# Patient Record
Sex: MALE | Race: WHITE | NOT HISPANIC OR LATINO | Employment: OTHER | URBAN - METROPOLITAN AREA
[De-identification: names, ages, dates, MRNs, and addresses within clinical notes are randomized per-mention and may not be internally consistent; named-entity substitution may affect disease eponyms.]

---

## 2023-05-25 LAB
RBC, URINE: NORMAL /HPF (ref 0–5)
SQUAMOUS EPITHELIAL CELLS, URINE: <1 /HPF
WBC, URINE: <1 /HPF (ref 0–5)

## 2023-05-26 LAB — URINE CULTURE: NORMAL

## 2023-09-20 PROBLEM — R56.9 SEIZURE (MULTI): Status: ACTIVE | Noted: 2023-09-20

## 2023-09-20 PROBLEM — D69.6 THROMBOCYTOPENIA (CMS-HCC): Status: ACTIVE | Noted: 2023-09-20

## 2023-09-20 PROBLEM — N40.0 BENIGN ENLARGEMENT OF PROSTATE: Status: ACTIVE | Noted: 2023-09-20

## 2023-09-20 PROBLEM — L82.1 SEBORRHEIC KERATOSIS: Status: ACTIVE | Noted: 2022-12-05

## 2023-09-20 PROBLEM — E66.3 OVERWEIGHT (BMI 25.0-29.9): Status: ACTIVE | Noted: 2023-09-20

## 2023-09-20 PROBLEM — L85.3 XEROSIS CUTIS: Status: ACTIVE | Noted: 2022-12-05

## 2023-09-20 PROBLEM — F32.A DEPRESSION: Status: ACTIVE | Noted: 2023-09-20

## 2023-09-20 PROBLEM — R39.15 URINARY URGENCY: Status: ACTIVE | Noted: 2023-09-20

## 2023-09-20 PROBLEM — D22.5 MELANOCYTIC NEVI OF TRUNK: Status: ACTIVE | Noted: 2022-12-05

## 2023-09-20 PROBLEM — K75.81 NONALCOHOLIC STEATOHEPATITIS (NASH): Status: ACTIVE | Noted: 2023-09-20

## 2023-09-20 PROBLEM — I63.9 CEREBROVASCULAR ACCIDENT (CVA) (MULTI): Status: ACTIVE | Noted: 2023-09-20

## 2023-09-20 PROBLEM — L57.0 ACTINIC KERATOSIS: Status: ACTIVE | Noted: 2022-12-05

## 2023-09-20 PROBLEM — D36.7 BENIGN NEOPLASM OF UPPER EXTREMITY: Status: ACTIVE | Noted: 2022-12-05

## 2023-09-20 PROBLEM — I87.2 CHRONIC VENOUS INSUFFICIENCY: Status: ACTIVE | Noted: 2022-12-05

## 2023-09-20 PROBLEM — R26.9 ABNORMALITY OF GAIT AND MOBILITY: Status: ACTIVE | Noted: 2023-09-20

## 2023-09-20 PROBLEM — L72.3 SEBACEOUS CYST: Status: ACTIVE | Noted: 2022-12-05

## 2023-09-20 PROBLEM — D18.01 HEMANGIOMA OF SKIN AND SUBCUTANEOUS TISSUE: Status: ACTIVE | Noted: 2022-12-05

## 2023-09-20 PROBLEM — I48.91 ATRIAL FIBRILLATION (MULTI): Status: ACTIVE | Noted: 2023-09-20

## 2023-09-20 PROBLEM — D22.30 MELANOCYTIC NEVI OF UNSPECIFIED PART OF FACE: Status: ACTIVE | Noted: 2022-12-05

## 2023-09-20 PROBLEM — D22.62 MELANOCYTIC NEVI OF LEFT UPPER LIMB, INCLUDING SHOULDER: Status: ACTIVE | Noted: 2022-12-05

## 2023-09-20 PROBLEM — R39.16 STRAINING TO VOID: Status: ACTIVE | Noted: 2023-09-20

## 2023-09-20 PROBLEM — D36.7 BENIGN NEOPLASM OF TRUNK: Status: ACTIVE | Noted: 2022-12-05

## 2023-09-20 PROBLEM — D22.72 MELANOCYTIC NEVI OF LEFT LOWER LIMB, INCLUDING HIP: Status: RESOLVED | Noted: 2022-12-05 | Resolved: 2023-09-20

## 2023-09-20 PROBLEM — R26.2 AMBULATORY DYSFUNCTION: Status: ACTIVE | Noted: 2023-09-20

## 2023-09-20 PROBLEM — I10 HYPERTENSION: Status: ACTIVE | Noted: 2023-09-20

## 2023-09-20 PROBLEM — I69.959 HEMIPLEGIA OF NONDOMINANT SIDE AS LATE EFFECT OF CEREBROVASCULAR DISEASE (MULTI): Status: ACTIVE | Noted: 2023-09-20

## 2023-09-20 PROBLEM — Z95.818 STATUS POST PLACEMENT OF IMPLANTABLE LOOP RECORDER: Status: ACTIVE | Noted: 2023-09-20

## 2023-09-20 PROBLEM — N40.0 BENIGN PROSTATIC HYPERPLASIA: Status: ACTIVE | Noted: 2023-09-20

## 2023-09-20 PROBLEM — D22.39 MELANOCYTIC NEVI OF OTHER PARTS OF FACE: Status: ACTIVE | Noted: 2022-12-05

## 2023-09-20 PROBLEM — E78.5 HYPERLIPIDEMIA: Status: ACTIVE | Noted: 2023-09-20

## 2023-09-20 PROBLEM — B35.3 TINEA PEDIS: Status: ACTIVE | Noted: 2022-12-05

## 2023-09-20 PROBLEM — L81.4 LENTIGINES: Status: ACTIVE | Noted: 2022-12-05

## 2023-09-20 PROBLEM — H91.90 HEARING IMPAIRMENT: Status: ACTIVE | Noted: 2023-09-20

## 2023-09-20 PROBLEM — D22.71 MELANOCYTIC NEVI OF RIGHT LOWER LIMB, INCLUDING HIP: Status: ACTIVE | Noted: 2022-12-05

## 2023-09-20 PROBLEM — D72.819 LEUKOPENIA: Status: ACTIVE | Noted: 2023-09-20

## 2023-09-20 PROBLEM — I73.9 PAD (PERIPHERAL ARTERY DISEASE) (CMS-HCC): Status: ACTIVE | Noted: 2023-09-20

## 2023-09-20 PROBLEM — L21.9 SEBORRHEIC DERMATITIS, UNSPECIFIED: Status: ACTIVE | Noted: 2022-12-05

## 2023-09-20 PROBLEM — Z86.73 HISTORY OF RIGHT MCA STROKE: Status: ACTIVE | Noted: 2023-09-20

## 2023-09-20 PROBLEM — G81.94 LEFT HEMIPARESIS (MULTI): Status: ACTIVE | Noted: 2023-09-20

## 2023-09-20 PROBLEM — L82.1 OTHER SEBORRHEIC KERATOSIS: Status: ACTIVE | Noted: 2022-12-05

## 2023-09-20 PROBLEM — Z79.01 CHRONIC ANTICOAGULATION: Status: ACTIVE | Noted: 2023-09-20

## 2023-09-20 PROBLEM — E55.9 VITAMIN D DEFICIENCY: Status: ACTIVE | Noted: 2023-09-20

## 2023-09-20 PROBLEM — R73.9 HYPERGLYCEMIA: Status: ACTIVE | Noted: 2023-09-20

## 2023-09-20 PROBLEM — L81.4 OTHER MELANIN HYPERPIGMENTATION: Status: ACTIVE | Noted: 2022-12-05

## 2023-09-20 PROBLEM — R34 OLIGURIA: Status: ACTIVE | Noted: 2023-09-20

## 2023-09-20 PROBLEM — D18.00 ANGIOMA: Status: ACTIVE | Noted: 2022-12-05

## 2023-09-20 PROBLEM — R73.03 PREDIABETES: Status: ACTIVE | Noted: 2023-09-20

## 2023-09-20 RX ORDER — WARFARIN 4 MG/1
TABLET ORAL
COMMUNITY

## 2023-09-20 RX ORDER — TRAZODONE HYDROCHLORIDE 50 MG/1
50 TABLET ORAL DAILY
COMMUNITY

## 2023-09-20 RX ORDER — METOPROLOL TARTRATE 50 MG/1
50 TABLET ORAL 2 TIMES DAILY
COMMUNITY
End: 2024-05-23 | Stop reason: SDUPTHER

## 2023-09-20 RX ORDER — TAMSULOSIN HYDROCHLORIDE 0.4 MG/1
1 CAPSULE ORAL DAILY
COMMUNITY
End: 2024-01-18 | Stop reason: SDUPTHER

## 2023-09-20 RX ORDER — DOCUSATE SODIUM 100 MG/1
100 CAPSULE, LIQUID FILLED ORAL 2 TIMES DAILY PRN
COMMUNITY

## 2023-09-20 RX ORDER — TRAMADOL HYDROCHLORIDE 50 MG/1
50 TABLET ORAL EVERY 4 HOURS PRN
COMMUNITY
End: 2023-10-19 | Stop reason: ALTCHOICE

## 2023-09-20 RX ORDER — AMLODIPINE BESYLATE 5 MG/1
5 TABLET ORAL DAILY
COMMUNITY
End: 2023-10-19 | Stop reason: SDUPTHER

## 2023-09-20 RX ORDER — LEVETIRACETAM 750 MG/1
1 TABLET ORAL 2 TIMES DAILY
COMMUNITY
Start: 2019-04-23

## 2023-09-20 RX ORDER — WARFARIN SODIUM 5 MG/1
1 TABLET ORAL DAILY
COMMUNITY

## 2023-09-20 RX ORDER — DULOXETIN HYDROCHLORIDE 30 MG/1
1 CAPSULE, DELAYED RELEASE ORAL DAILY
COMMUNITY
Start: 2022-07-05 | End: 2024-01-18 | Stop reason: SDUPTHER

## 2023-09-20 RX ORDER — BACLOFEN 10 MG/1
1.5 TABLET ORAL 3 TIMES DAILY
COMMUNITY
Start: 2017-04-05

## 2023-09-20 RX ORDER — GABAPENTIN 800 MG/1
800 TABLET ORAL 3 TIMES DAILY
COMMUNITY
End: 2023-10-19 | Stop reason: ALTCHOICE

## 2023-09-20 RX ORDER — ASPIRIN 81 MG/1
81 TABLET ORAL DAILY
COMMUNITY

## 2023-09-20 RX ORDER — ACETAMINOPHEN 500 MG
5000 TABLET ORAL DAILY
COMMUNITY

## 2023-09-20 RX ORDER — FINASTERIDE 5 MG/1
1 TABLET, FILM COATED ORAL DAILY
COMMUNITY
End: 2023-10-19 | Stop reason: ALTCHOICE

## 2023-09-20 RX ORDER — TALC
POWDER (GRAM) TOPICAL
COMMUNITY
End: 2023-10-19 | Stop reason: ALTCHOICE

## 2023-09-20 RX ORDER — KETOCONAZOLE 20 MG/G
1 CREAM TOPICAL
COMMUNITY
Start: 2021-12-01 | End: 2023-10-19 | Stop reason: ALTCHOICE

## 2023-09-20 RX ORDER — AMMONIUM LACTATE 12 G/100G
1 CREAM TOPICAL
COMMUNITY
Start: 2017-05-23 | End: 2023-10-19 | Stop reason: ALTCHOICE

## 2023-09-20 RX ORDER — MELATONIN 5 MG
1 CAPSULE ORAL NIGHTLY PRN
COMMUNITY
End: 2023-10-19 | Stop reason: ALTCHOICE

## 2023-09-20 RX ORDER — ATORVASTATIN CALCIUM 40 MG/1
40 TABLET, FILM COATED ORAL DAILY
COMMUNITY
Start: 2016-11-07 | End: 2024-01-18 | Stop reason: SDUPTHER

## 2023-09-20 RX ORDER — WARFARIN 6 MG/1
1 TABLET ORAL DAILY
COMMUNITY
End: 2024-05-23 | Stop reason: SDUPTHER

## 2023-09-20 RX ORDER — LISINOPRIL 20 MG/1
20 TABLET ORAL DAILY
COMMUNITY
End: 2024-01-18 | Stop reason: DRUGHIGH

## 2023-10-19 ENCOUNTER — OFFICE VISIT (OUTPATIENT)
Dept: CARDIOLOGY | Facility: CLINIC | Age: 78
End: 2023-10-19
Payer: MEDICARE

## 2023-10-19 VITALS
WEIGHT: 200 LBS | BODY MASS INDEX: 28.63 KG/M2 | HEART RATE: 64 BPM | HEIGHT: 70 IN | DIASTOLIC BLOOD PRESSURE: 76 MMHG | SYSTOLIC BLOOD PRESSURE: 118 MMHG | TEMPERATURE: 97.7 F

## 2023-10-19 DIAGNOSIS — I10 PRIMARY HYPERTENSION: ICD-10-CM

## 2023-10-19 DIAGNOSIS — Z79.01 CHRONIC ANTICOAGULATION: ICD-10-CM

## 2023-10-19 DIAGNOSIS — I48.19 PERSISTENT ATRIAL FIBRILLATION (MULTI): Primary | ICD-10-CM

## 2023-10-19 PROCEDURE — 1159F MED LIST DOCD IN RCRD: CPT | Performed by: INTERNAL MEDICINE

## 2023-10-19 PROCEDURE — 3078F DIAST BP <80 MM HG: CPT | Performed by: INTERNAL MEDICINE

## 2023-10-19 PROCEDURE — 3074F SYST BP LT 130 MM HG: CPT | Performed by: INTERNAL MEDICINE

## 2023-10-19 PROCEDURE — 99214 OFFICE O/P EST MOD 30 MIN: CPT | Performed by: INTERNAL MEDICINE

## 2023-10-19 PROCEDURE — 1125F AMNT PAIN NOTED PAIN PRSNT: CPT | Performed by: INTERNAL MEDICINE

## 2023-10-19 RX ORDER — DOXAZOSIN 1 MG/1
1 TABLET ORAL NIGHTLY
COMMUNITY
Start: 2023-06-23

## 2023-10-19 RX ORDER — AMLODIPINE BESYLATE 5 MG/1
5 TABLET ORAL DAILY
Qty: 90 TABLET | Refills: 3 | Status: SHIPPED | OUTPATIENT
Start: 2023-10-19 | End: 2024-10-18

## 2023-10-19 RX ORDER — LISINOPRIL 40 MG/1
40 TABLET ORAL DAILY
COMMUNITY
Start: 2023-09-22 | End: 2024-01-18 | Stop reason: SDUPTHER

## 2023-10-19 ASSESSMENT — PAIN SCALES - GENERAL: PAINLEVEL: 8

## 2023-10-19 NOTE — PROGRESS NOTES
· HTN (hypertension) (401.9) (I10)   · Atrial fibrillation (427.31) (I48.91)   . CVA Right MCA, left hemiparesisPatient is a pleasant 78-year-old male with the above-noted pertinent past medical history who presents today for follow-up.  He is accompanied by his wife who provides most of the history stating that he is doing very well he has no complaints of palpitation, he denies any complaints of orthopnea or PND he mostly ambulates with the help of a wheelchair, he has no complaints of chest pain shortness of breath  Vital signs reviewed and stable,  No carotid bruits upstroke and volumes are normal, heart irregularly irregular S1 variable S2 is normal no systolic or diastolic murmurs appreciated lungs clear to auscultation abdomen is obese positive bowel sounds soft and nontender  Echocardiography showed normal LV systolic function LVEF of 60-65% mild left atrial enlargement, 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

## 2023-10-24 LAB
NON-UH HIE INR: 2.2
NON-UH HIE PROTIME PATIENT: 25.4 SECONDS (ref 9.8–12.8)

## 2023-11-22 LAB
NON-UH HIE INR: 2.1
NON-UH HIE PROTIME PATIENT: 23.8 SECONDS (ref 9.8–12.8)

## 2023-11-24 ENCOUNTER — TELEPHONE (OUTPATIENT)
Dept: PRIMARY CARE | Facility: CLINIC | Age: 78
End: 2023-11-24
Payer: MEDICARE

## 2023-12-11 ENCOUNTER — OFFICE VISIT (OUTPATIENT)
Dept: DERMATOLOGY | Facility: CLINIC | Age: 78
End: 2023-12-11
Payer: MEDICARE

## 2023-12-11 DIAGNOSIS — L57.0 ACTINIC KERATOSIS: ICD-10-CM

## 2023-12-11 DIAGNOSIS — L82.1 SEBORRHEIC KERATOSIS: ICD-10-CM

## 2023-12-11 DIAGNOSIS — D22.9 NEVUS: ICD-10-CM

## 2023-12-11 DIAGNOSIS — Z12.83 SKIN CANCER SCREENING: Primary | ICD-10-CM

## 2023-12-11 DIAGNOSIS — L81.4 LENTIGO: ICD-10-CM

## 2023-12-11 PROCEDURE — 99213 OFFICE O/P EST LOW 20 MIN: CPT | Performed by: NURSE PRACTITIONER

## 2023-12-11 PROCEDURE — 1159F MED LIST DOCD IN RCRD: CPT | Performed by: NURSE PRACTITIONER

## 2023-12-11 PROCEDURE — 1125F AMNT PAIN NOTED PAIN PRSNT: CPT | Performed by: NURSE PRACTITIONER

## 2023-12-11 NOTE — PROGRESS NOTES
Subjective     Jose Angel Riojas is a 78 y.o. male who presents for the following: Skin Check.       Established patient visit. Last seen one year ago.   Hx of actinic keratosis.     Review of Systems:  No other skin or systemic complaints other than what is documented elsewhere in the note.    The following portions of the chart were reviewed this encounter and updated as appropriate:       Skin Cancer History  No skin cancer on file.    Specialty Problems          Dermatology Problems    Actinic keratosis    Angioma    Hemangioma of skin and subcutaneous tissue    Lentigines    Melanocytic nevi of left upper limb, including shoulder    Melanocytic nevi of other parts of face    Melanocytic nevi of right lower limb, including hip    Melanocytic nevi of trunk    Melanocytic nevi of unspecified part of face    Other melanin hyperpigmentation    Other seborrheic keratosis    Sebaceous cyst    Seborrheic dermatitis, unspecified    Seborrheic keratosis    Tinea pedis    Xerosis cutis     Past Medical History:  Jose Angel Riojas  has a past medical history of Benign prostatic hyperplasia with lower urinary tract symptoms (12/21/2013), 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), Disorder of male genital organs, unspecified (06/24/2014), Disorder of the skin and subcutaneous tissue, unspecified, Encounter for immunization, Hemiplegia and hemiparesis following cerebral infarction affecting unspecified side (CMS/HCC) (12/15/2021), History of falling, Neuralgia and neuritis, unspecified (08/20/2019), Other chest pain, Other reduced mobility (02/04/2021), Other specified soft tissue disorders, Other symptoms and signs involving the musculoskeletal system (10/28/2020), Other vitreous opacities, unspecified eye (06/24/2014), Pain in leg, unspecified, Personal history of other (healed) physical injury and trauma, Personal history of other diseases of the musculoskeletal system and connective tissue (02/19/2019), Personal history of other  diseases of the musculoskeletal system and connective tissue (06/24/2014), Personal history of other diseases of the nervous system and sense organs (06/24/2014), Personal history of other specified conditions (12/10/2021), Personal history of other specified conditions, Personal history of other specified conditions, Personal history of other specified conditions (12/06/2018), Personal history of transient ischemic attack (TIA), and cerebral infarction without residual deficits, Personal history of transient ischemic attack (TIA), and cerebral infarction without residual deficits (06/06/2019), Personal history of urinary (tract) infections (12/10/2021), Sciatica, left side, Spastic hemiplegia affecting unspecified side (CMS/HCC) (04/19/2022), and Tinea unguium.    Past Surgical History:  Jose Angel Riojas  has a past surgical history that includes Vasectomy (06/25/2014); Other surgical history (12/14/2021); Other surgical history (12/14/2021); Other surgical history (11/23/2016); Other surgical history (08/18/2016); MR angio head wo IV contrast (6/29/2016); and MR angio neck wo IV contrast (6/29/2016).    Family History:  Patient family history includes Cerebral aneurysm in his father; Coronary artery disease in his brother; Diabetes in his mother; Heart attack in his brother; Valvular heart disease in his father.    Social History:  Jose Angel Riojas  reports that he does not have a smoking history on file. He has never been exposed to tobacco smoke. He has never used smokeless tobacco. He reports that he does not drink alcohol and does not use drugs.    Allergies:  Peanut    Current Medications / CAM's:    Current Outpatient Medications:     amLODIPine (Norvasc) 5 mg tablet, Take 1 tablet (5 mg) by mouth once daily., Disp: 90 tablet, Rfl: 3    aspirin 81 mg EC tablet, Take 1 tablet (81 mg) by mouth once daily., Disp: , Rfl:     atorvastatin (Lipitor) 40 mg tablet, Take 1 tablet (40 mg) by mouth once daily., Disp: ,  Rfl:     baclofen (Lioresal) 10 mg tablet, Take 0.5 tablets (5 mg) by mouth 3 times a day., Disp: , Rfl:     cholecalciferol (Vitamin D3) 50 mcg (2,000 unit) capsule, Take 5,000 Units by mouth early in the morning.., Disp: , Rfl:     docusate sodium (Colace) 100 mg capsule, Take 1 capsule (100 mg) by mouth 2 times a day as needed., Disp: , Rfl:     doxazosin (Cardura) 1 mg tablet, Take 1 tablet (1 mg) by mouth once daily at bedtime., Disp: , Rfl:     DULoxetine (Cymbalta) 30 mg DR capsule, Take 1 capsule (30 mg) by mouth once daily., Disp: , Rfl:     levETIRAcetam (Keppra) 750 mg tablet, Take 1 tablet (750 mg) by mouth 2 times a day., Disp: , Rfl:     lisinopril 20 mg tablet, Take 1 tablet (20 mg) by mouth once daily., Disp: , Rfl:     lisinopril 40 mg tablet, Take 1 tablet (40 mg) by mouth once daily. as directed, Disp: , Rfl:     metoprolol tartrate (Lopressor) 50 mg tablet, Take 1 tablet by mouth 2 times a day., Disp: , Rfl:     tamsulosin (Flomax) 0.4 mg 24 hr capsule, Take 1 capsule (0.4 mg) by mouth once daily., Disp: , Rfl:     traZODone (Desyrel) 50 mg tablet, Take 1 tablet (50 mg) by mouth once daily., Disp: , Rfl:     warfarin (Coumadin) 4 mg tablet, Take by mouth. Take as directed per After Visit Summary., Disp: , Rfl:     warfarin (Coumadin) 5 mg tablet, Take 1 tablet (5 mg) by mouth once daily., Disp: , Rfl:     warfarin (Coumadin) 6 mg tablet, Take 1 tablet (6 mg) by mouth once daily., Disp: , Rfl:      Objective   Well appearing patient in no apparent distress; mood and affect are within normal limits.    A full examination was performed including scalp, head, eyes, ears, nose, lips, neck, chest, axillae, abdomen, back, buttocks, bilateral upper extremities, bilateral lower extremities, hands, feet, fingers, toes, fingernails, and toenails. All findings within normal limits unless otherwise noted below.    Assessment/Plan   1. Skin cancer screening    The patient presented for a routine skin  examination today. There are no specific concerns regarding skin health and no new or changing moles, lesions, or rashes.     Assessment: Based on the comprehensive skin examination, there were no concerning or abnormal findings. The patient's skin appeared to be in good health, without any notable dermatologic conditions or lesions.    Plan: Given the absence of any significant skin findings, no specific interventions or treatments are warranted at this time. The patient was educated on the importance of regular skin self-examinations and advised to promptly report any changes or concerns. Routine follow-up for a skin examination was recommended.    -These lesions have benign, reassuring patterns on dermoscopy.  -There were no concerning features found on exam today.  -Recommend continued self-observation, and to contact the office if any changes in nevi are  noticed.    Discussed/information given on safe sun practices and use of sunscreen, sun protective clothing or sun avoidance. Recommend to use OTC medication of sunscreen SPF 30 or higher on a daily basis prior to sun exposure to reduce the risk of skin cancer.    Contact Office if: Any lesions change in size, shape or color; itch, bum or bleed.         2. Nevus  Multiple benign appearing flesh colored to pigmented macules and papules     Plan: Counseling.  I counseled the patient regarding the following:  Instructions: Monthly self-skin checks to monitor for any changes in moles are recommended. Expectations: Benign Nevi are pigmented nests of cells within the skin.No treatment is necessary. Contact Office if: Any moles change in size, shape or color; itch, bum or bleed.    3. Lentigo  Benign pigmented macules appearing in sun exposed areas     Solar lentigo (a type of lentigo also known as a senile lentigo, age spot, or liver spot) is a benign pigmented macule appearing on fair-skinned individuals that is related to ultraviolet radiation (UVR) exposure,  "typically from the sun.     PLAN:  Limiting sun exposure through avoidance, protective clothing, and use of sunscreens can help prevent the appearance of solar lentigines.    If lesion changes or becomes symptomatic she should return to clinic    4. Seborrheic keratosis    Seborrheic keratoses (SKs) are extremely common benign neoplasms of the skin. There can be few or hundreds of these raised, \"stuck-on\"-appearing papules and plaques with well-defined borders. The cause is unknown, although there is a familial trait for the development of multiple SKs.      SKs tend to increase in incidence and number with increasing age.     Skin Care: Seborrheic Keratoses are benign. No treatment is necessary.    Patient was instructed to call the office if any lesions become irritated or inflamed        5. Actinic keratosis (2)  Right Buccal Cheek, Right Zygomatic Area  Numerous erythematous macules and patches with sandpaper texture    -Discussed nature of diagnosis and treatment options.   -Patient wishes to proceed with Cryotherapy today  -Possible side effects of liquid nitrogen treatment reviewed including formation of blisters, crusting, tenderness, scar, and discoloration which may be permanent.  -Patient advised to return the office for re-evaluation if the treated lesion(s) do not resolve within 4-6 weeks. Patient verbalizes understanding.    Cryotherapy, skin lesion - Right Buccal Cheek, Right Zygomatic Area           "

## 2023-12-20 LAB
NON-UH HIE INR: 2.2
NON-UH HIE PROTIME PATIENT: 24.5 SECONDS (ref 9.8–12.8)

## 2024-01-11 LAB
NON-UH HIE A/G RATIO: 1.2
NON-UH HIE ALB: 4 G/DL (ref 3.4–5)
NON-UH HIE ALK PHOS: 70 UNIT/L (ref 45–117)
NON-UH HIE BILIRUBIN, TOTAL: 1.2 MG/DL (ref 0.3–1.2)
NON-UH HIE BUN/CREAT RATIO: 16
NON-UH HIE BUN: 16 MG/DL (ref 9–23)
NON-UH HIE CALCIUM: 9.5 MG/DL (ref 8.7–10.4)
NON-UH HIE CALCULATED LDL CHOLESTEROL: 68 MG/DL (ref 60–130)
NON-UH HIE CALCULATED OSMOLALITY: 286 MOSM/KG (ref 275–295)
NON-UH HIE CHLORIDE: 106 MMOL/L (ref 98–107)
NON-UH HIE CHOLESTEROL: 120 MG/DL (ref 100–200)
NON-UH HIE CO2, VENOUS: 29 MMOL/L (ref 20–31)
NON-UH HIE CREATININE: 1 MG/DL (ref 0.6–1.1)
NON-UH HIE GFR AA: >60
NON-UH HIE GLOBULIN: 3.2 G/DL
NON-UH HIE GLOMERULAR FILTRATION RATE: >60 ML/MIN/1.73M?
NON-UH HIE GLUCOSE: 101 MG/DL (ref 74–106)
NON-UH HIE GOT: 14 UNIT/L (ref 15–37)
NON-UH HIE GPT: 17 UNIT/L (ref 10–49)
NON-UH HIE HCT: 40.8 % (ref 41–52)
NON-UH HIE HDL CHOLESTEROL: 38 MG/DL (ref 40–60)
NON-UH HIE HGB A1C: 6.1 %
NON-UH HIE HGB: 13.8 G/DL (ref 13.5–17.5)
NON-UH HIE INR: 2
NON-UH HIE INSTR WBC ND: 4.3
NON-UH HIE K: 4.1 MMOL/L (ref 3.5–5.1)
NON-UH HIE MCH: 30.9 PG (ref 27–34)
NON-UH HIE MCHC: 33.9 G/DL (ref 32–37)
NON-UH HIE MCV: 91.4 FL (ref 80–100)
NON-UH HIE MPV: 9.5 FL (ref 7.4–10.4)
NON-UH HIE NA: 143 MMOL/L (ref 135–145)
NON-UH HIE PLATELET: 134 X10 (ref 150–450)
NON-UH HIE PROTIME PATIENT: 23 SECONDS (ref 9.8–12.8)
NON-UH HIE RBC: 4.46 X10 (ref 4.7–6.1)
NON-UH HIE RDW: 14 % (ref 11.5–14.5)
NON-UH HIE TOTAL CHOL/HDL CHOL RATIO: 3.2
NON-UH HIE TOTAL PROTEIN: 7.2 G/DL (ref 5.7–8.2)
NON-UH HIE TRIGLYCERIDES: 72 MG/DL (ref 30–150)
NON-UH HIE TSH: 0.99 UIU/ML (ref 0.55–4.78)
NON-UH HIE VIT D 25: 75 NG/ML
NON-UH HIE WBC: 4.3 X10 (ref 4.5–11)

## 2024-01-12 ENCOUNTER — TELEPHONE (OUTPATIENT)
Dept: PRIMARY CARE | Facility: CLINIC | Age: 79
End: 2024-01-12
Payer: MEDICARE

## 2024-01-12 NOTE — TELEPHONE ENCOUNTER
Pt is aware of results.     ----- Message from Tim Castillo MD sent at 1/12/2024  9:57 AM EST -----  Slightly low platelet.  I will discuss in office visit.  All other results are okay  ----- Message -----  From: Wally Delgadillo - Lab Results In  Sent: 1/11/2024   9:45 PM EST  To: Tim Castillo MD

## 2024-01-18 ENCOUNTER — OFFICE VISIT (OUTPATIENT)
Dept: PRIMARY CARE | Facility: CLINIC | Age: 79
End: 2024-01-18
Payer: MEDICARE

## 2024-01-18 VITALS — HEART RATE: 70 BPM | DIASTOLIC BLOOD PRESSURE: 78 MMHG | SYSTOLIC BLOOD PRESSURE: 122 MMHG

## 2024-01-18 DIAGNOSIS — E55.9 VITAMIN D DEFICIENCY: ICD-10-CM

## 2024-01-18 DIAGNOSIS — G81.94 LEFT HEMIPARESIS (MULTI): ICD-10-CM

## 2024-01-18 DIAGNOSIS — N40.0 BENIGN PROSTATIC HYPERPLASIA WITHOUT LOWER URINARY TRACT SYMPTOMS: ICD-10-CM

## 2024-01-18 DIAGNOSIS — I10 PRIMARY HYPERTENSION: ICD-10-CM

## 2024-01-18 DIAGNOSIS — E78.00 PURE HYPERCHOLESTEROLEMIA: ICD-10-CM

## 2024-01-18 DIAGNOSIS — I48.19 PERSISTENT ATRIAL FIBRILLATION (MULTI): ICD-10-CM

## 2024-01-18 DIAGNOSIS — F32.A CHRONIC DEPRESSION: ICD-10-CM

## 2024-01-18 DIAGNOSIS — D69.6 THROMBOCYTOPENIA (CMS-HCC): ICD-10-CM

## 2024-01-18 DIAGNOSIS — R73.9 HYPERGLYCEMIA: Primary | ICD-10-CM

## 2024-01-18 PROCEDURE — 1159F MED LIST DOCD IN RCRD: CPT | Performed by: INTERNAL MEDICINE

## 2024-01-18 PROCEDURE — 3078F DIAST BP <80 MM HG: CPT | Performed by: INTERNAL MEDICINE

## 2024-01-18 PROCEDURE — 99214 OFFICE O/P EST MOD 30 MIN: CPT | Performed by: INTERNAL MEDICINE

## 2024-01-18 PROCEDURE — 3074F SYST BP LT 130 MM HG: CPT | Performed by: INTERNAL MEDICINE

## 2024-01-18 PROCEDURE — 1125F AMNT PAIN NOTED PAIN PRSNT: CPT | Performed by: INTERNAL MEDICINE

## 2024-01-18 RX ORDER — DULOXETIN HYDROCHLORIDE 30 MG/1
30 CAPSULE, DELAYED RELEASE ORAL DAILY
Qty: 90 CAPSULE | Refills: 1 | Status: SHIPPED | OUTPATIENT
Start: 2024-01-18 | End: 2024-05-23 | Stop reason: SDUPTHER

## 2024-01-18 RX ORDER — LISINOPRIL 40 MG/1
40 TABLET ORAL DAILY
Qty: 90 TABLET | Refills: 1 | Status: SHIPPED | OUTPATIENT
Start: 2024-01-18

## 2024-01-18 RX ORDER — ATORVASTATIN CALCIUM 40 MG/1
40 TABLET, FILM COATED ORAL DAILY
Qty: 90 TABLET | Refills: 1 | Status: SHIPPED | OUTPATIENT
Start: 2024-01-18

## 2024-01-18 RX ORDER — TAMSULOSIN HYDROCHLORIDE 0.4 MG/1
0.4 CAPSULE ORAL DAILY
Qty: 90 CAPSULE | Refills: 1 | Status: SHIPPED | OUTPATIENT
Start: 2024-01-18

## 2024-01-18 ASSESSMENT — PATIENT HEALTH QUESTIONNAIRE - PHQ9
SUM OF ALL RESPONSES TO PHQ9 QUESTIONS 1 AND 2: 0
2. FEELING DOWN, DEPRESSED OR HOPELESS: NOT AT ALL
1. LITTLE INTEREST OR PLEASURE IN DOING THINGS: NOT AT ALL

## 2024-01-18 NOTE — PROGRESS NOTES
Patient is seen office today for follow-up of his medical problems including hypertension, hyperlipidemia, hyperglycemia, vitamin D deficiency and other medical problems.  He also wants to discuss the result of blood test which was done earlier this month.  Most of the readings are good except his platelet count is slightly low at 134.  Patient is anticoagulated for chronic atrial fibrillation.  Recent INRs have been therapeutic.  Patient also needs a refill on some of his prescriptions.  He denies any fever chills or EXTR.  Has no headache or visual disturbances.  He has no chest pain or palpitation.  Has no respiratory symptoms.  Left-sided weakness is unchanged.  Denies any joint swelling or pain.  He has no polyuria, polydipsia any other symptom uncontrolled hyperglycemia.  Review of other systems are negative.    On examination:  General examination: There is no acute discomfort  Eyes: There is no pallor or jaundice pupils are equal and reactive to light  Oral cavity throat normal  Neck: There is no JVD or carotid bruit  Lungs: Clear to auscultation  CVS: Heart sounds are irregularly irregular.  There is no additional sounds  Abdomen: There is no organomegaly or tenderness  CNS: Left-sided weakness is unchanged  Musculoskeletal system there is no joint swelling or deformity  Legs: Edema of the left leg is present  Skin: No rash    Assessment and plan  1.  Hypertension: Controlled on lisinopril and metoprolol.  Prescription for lisinopril is any new  2.  Hyperlipidemia: Prescription for atorvastatin is renewed.  LDL is at goal  3.  Hyperglycemia: Recent blood sugar levels are acceptable.  His hemoglobin A1c is 6.1  4.  Thrombocytopenia.  Platelet count is slightly low at 134.  I will check his CBC in 1 month.  Patient is advised to watch for any signs of bleeding  5.  Chronic atrial fibrillation: Patient is being followed by Dr. Ben GONZALES.  He is anticoagulated and rate controlled  6.  Vitamin D dispensary: I will  check his vitamin D level again  7.  Benign prostatic hypertrophy.  Prescription for his Flomax there is a note he has no urinary symptoms at this time  8.  Left hemiparesis: Supportive care  9.  Health maintenance: He had a colonoscopy done 5 years ago he goes to his urologist for periodic prostate checkup.  He will be seen my office in 4 months.

## 2024-01-30 ENCOUNTER — OFFICE VISIT (OUTPATIENT)
Dept: PRIMARY CARE | Facility: CLINIC | Age: 79
End: 2024-01-30
Payer: MEDICARE

## 2024-01-30 VITALS
BODY MASS INDEX: 28.63 KG/M2 | HEIGHT: 70 IN | DIASTOLIC BLOOD PRESSURE: 80 MMHG | SYSTOLIC BLOOD PRESSURE: 117 MMHG | OXYGEN SATURATION: 98 % | TEMPERATURE: 97.1 F | WEIGHT: 200 LBS | HEART RATE: 60 BPM

## 2024-01-30 DIAGNOSIS — R07.81 CHEST PAIN, PLEURITIC: Primary | ICD-10-CM

## 2024-01-30 DIAGNOSIS — I10 PRIMARY HYPERTENSION: ICD-10-CM

## 2024-01-30 DIAGNOSIS — R07.81 RIB PAIN ON LEFT SIDE: ICD-10-CM

## 2024-01-30 PROCEDURE — 99213 OFFICE O/P EST LOW 20 MIN: CPT | Performed by: INTERNAL MEDICINE

## 2024-01-30 PROCEDURE — 1159F MED LIST DOCD IN RCRD: CPT | Performed by: INTERNAL MEDICINE

## 2024-01-30 PROCEDURE — 3079F DIAST BP 80-89 MM HG: CPT | Performed by: INTERNAL MEDICINE

## 2024-01-30 PROCEDURE — 1125F AMNT PAIN NOTED PAIN PRSNT: CPT | Performed by: INTERNAL MEDICINE

## 2024-01-30 PROCEDURE — 3074F SYST BP LT 130 MM HG: CPT | Performed by: INTERNAL MEDICINE

## 2024-01-30 PROCEDURE — 1036F TOBACCO NON-USER: CPT | Performed by: INTERNAL MEDICINE

## 2024-01-30 ASSESSMENT — ENCOUNTER SYMPTOMS
DEPRESSION: 0
OCCASIONAL FEELINGS OF UNSTEADINESS: 0
LOSS OF SENSATION IN FEET: 0

## 2024-01-31 ENCOUNTER — TELEPHONE (OUTPATIENT)
Dept: PRIMARY CARE | Facility: CLINIC | Age: 79
End: 2024-01-31
Payer: MEDICARE

## 2024-01-31 NOTE — TELEPHONE ENCOUNTER
Left a message for pt.     ----- Message from Tim Castillo MD sent at 1/31/2024 10:25 AM EST -----  Chest x-ray is negative for any acute abnormality.  Result of the rib x-ray is pending  ----- Message -----  From: Interface, Onbase - Scan In  Sent: 1/31/2024  10:13 AM EST  To: Tim Castillo MD

## 2024-02-01 ENCOUNTER — TELEPHONE (OUTPATIENT)
Dept: PRIMARY CARE | Facility: CLINIC | Age: 79
End: 2024-02-01
Payer: MEDICARE

## 2024-02-01 NOTE — TELEPHONE ENCOUNTER
Pt is aware of results.   ----- Message from Tim Castillo MD sent at 2/1/2024 10:07 AM EST -----  I called the patient and left a message in the voicemail.  There is no fracture of the ribs or no acute abnormality of the chest x-ray.  If symptoms are persistent I can order a CT scan of the chest or he can follow-up with the neurologist to see if this is related to his neuropathy  ----- Message -----  From: Leona, Onbase - Scan In  Sent: 1/31/2024   3:29 PM EST  To: Tim Castillo MD

## 2024-02-12 LAB
NON-UH HIE BASO COUNT: 0.02 X1000 (ref 0–0.2)
NON-UH HIE BASOS %: 0.3 %
NON-UH HIE DIFF?: NO
NON-UH HIE DXH ACTIONS: ABNORMAL
NON-UH HIE EOS COUNT: 0.02 X1000 (ref 0–0.5)
NON-UH HIE EOSIN %: 0.4 %
NON-UH HIE HCT: 40.7 % (ref 41–52)
NON-UH HIE HGB: 14 G/DL (ref 13.5–17.5)
NON-UH HIE INR: 2.1
NON-UH HIE INSTR WBC: 4.5
NON-UH HIE LYMPH %: 38.8 %
NON-UH HIE LYMPH COUNT: 1.74 X1000 (ref 1.2–4.8)
NON-UH HIE MCH: 31.3 PG (ref 27–34)
NON-UH HIE MCHC: 34.3 G/DL (ref 32–37)
NON-UH HIE MCV: 91.2 FL (ref 80–100)
NON-UH HIE MONO %: 24.1 %
NON-UH HIE MONO COUNT: 1.08 X1000 (ref 0.1–1)
NON-UH HIE MPV: 9.9 FL (ref 7.4–10.4)
NON-UH HIE NEUTROPHIL %: 36.3 %
NON-UH HIE NEUTROPHIL COUNT (ANC): 1.62 X1000 (ref 1.4–8.8)
NON-UH HIE NUCLEATED RBC: 0 /100WBC
NON-UH HIE PLATELET: 130 X10 (ref 150–450)
NON-UH HIE PROTIME PATIENT: 24.4 SECONDS (ref 9.8–12.8)
NON-UH HIE RBC: 4.47 X10 (ref 4.7–6.1)
NON-UH HIE RDW: 14 % (ref 11.5–14.5)
NON-UH HIE RED BLOOD CELL MORPHOLOGY: ABNORMAL
NON-UH HIE SCAN DIFFERENTIAL: ABNORMAL
NON-UH HIE WBC: 4.5 X10 (ref 4.5–11)

## 2024-02-13 ENCOUNTER — ANTICOAGULATION - WARFARIN VISIT (OUTPATIENT)
Dept: CARDIOLOGY | Facility: CLINIC | Age: 79
End: 2024-02-13
Payer: MEDICARE

## 2024-02-13 ENCOUNTER — TELEPHONE (OUTPATIENT)
Dept: PRIMARY CARE | Facility: CLINIC | Age: 79
End: 2024-02-13
Payer: MEDICARE

## 2024-02-13 NOTE — TELEPHONE ENCOUNTER
Pt is aware of results.   ----- Message from Tim Castillo MD sent at 2/13/2024 10:15 AM EST -----  Shows low platelet count as before.  Other result of the CBC is acceptable  ----- Message -----  From: Wally Delgadillo - Lab Results In  Sent: 2/12/2024   7:00 PM EST  To: Tim Castillo MD

## 2024-02-14 LAB
INR IN PPP BY COAGULATION ASSAY EXTERNAL: 2.1 (ref 2–3)
PROTHROMBIN TIME (PT) IN PPP BY COAGULATION ASSAY EXTERNAL: NORMAL SECONDS

## 2024-02-27 ENCOUNTER — ANTICOAGULATION - WARFARIN VISIT (OUTPATIENT)
Dept: PRIMARY CARE | Facility: CLINIC | Age: 79
End: 2024-02-27
Payer: MEDICARE

## 2024-02-27 LAB
NON-UH HIE INR: 2.1
NON-UH HIE PROTIME PATIENT: 23.9 SECONDS (ref 9.8–12.8)

## 2024-03-26 LAB
NON-UH HIE INR: 2.1
NON-UH HIE PROTIME PATIENT: 23.9 SECONDS (ref 9.8–12.8)

## 2024-03-28 ENCOUNTER — ANTICOAGULATION - WARFARIN VISIT (OUTPATIENT)
Dept: CARDIOLOGY | Facility: CLINIC | Age: 79
End: 2024-03-28
Payer: MEDICARE

## 2024-04-23 ENCOUNTER — ANTICOAGULATION - WARFARIN VISIT (OUTPATIENT)
Dept: PRIMARY CARE | Facility: CLINIC | Age: 79
End: 2024-04-23
Payer: MEDICARE

## 2024-04-23 LAB
NON-UH HIE INR: 2.1
NON-UH HIE PROTIME PATIENT: 24.3 SECONDS (ref 9.8–12.8)

## 2024-05-16 ENCOUNTER — ANTICOAGULATION - WARFARIN VISIT (OUTPATIENT)
Dept: CARDIOLOGY | Facility: CLINIC | Age: 79
End: 2024-05-16
Payer: MEDICARE

## 2024-05-16 LAB
INR IN PPP BY COAGULATION ASSAY EXTERNAL: 2.3 (ref 2–3)
NON-UH HIE A/G RATIO: 1.2
NON-UH HIE ALB: 4 G/DL (ref 3.4–5)
NON-UH HIE ALK PHOS: 76 UNIT/L (ref 45–117)
NON-UH HIE BILIRUBIN, TOTAL: 1.2 MG/DL (ref 0.3–1.2)
NON-UH HIE BUN/CREAT RATIO: 15.6
NON-UH HIE BUN: 14 MG/DL (ref 9–23)
NON-UH HIE CALCIUM: 9.4 MG/DL (ref 8.7–10.4)
NON-UH HIE CALCULATED OSMOLALITY: 282 MOSM/KG (ref 275–295)
NON-UH HIE CHLORIDE: 105 MMOL/L (ref 98–107)
NON-UH HIE CO2, VENOUS: 29 MMOL/L (ref 20–31)
NON-UH HIE CREATININE: 0.9 MG/DL (ref 0.6–1.1)
NON-UH HIE GFR AA: >60
NON-UH HIE GLOBULIN: 3.3 G/DL
NON-UH HIE GLOMERULAR FILTRATION RATE: >60 ML/MIN/1.73M?
NON-UH HIE GLUCOSE: 94 MG/DL (ref 74–106)
NON-UH HIE GOT: 17 UNIT/L (ref 15–37)
NON-UH HIE GPT: 19 UNIT/L (ref 10–49)
NON-UH HIE HGB A1C: 6 %
NON-UH HIE INR: 2.3
NON-UH HIE K: 3.9 MMOL/L (ref 3.5–5.1)
NON-UH HIE NA: 141 MMOL/L (ref 135–145)
NON-UH HIE PROTIME PATIENT: 25.9 SECONDS (ref 9.8–12.8)
NON-UH HIE TOTAL PROTEIN: 7.3 G/DL (ref 5.7–8.2)
NON-UH HIE VIT D 25: 73 NG/ML
POC INR: 2.3 (ref 2–3)
POC PROTHROMBIN TIME: NORMAL
PROTHROMBIN TIME (PT) IN PPP BY COAGULATION ASSAY EXTERNAL: NORMAL SECONDS

## 2024-05-22 PROBLEM — G81.94 LEFT HEMIPLEGIA (MULTI): Status: ACTIVE | Noted: 2024-05-22

## 2024-05-22 PROBLEM — G40.909 EPILEPSY (MULTI): Status: ACTIVE | Noted: 2024-05-22

## 2024-05-22 PROBLEM — I69.398 SPASTICITY DUE TO OLD STROKE: Status: ACTIVE | Noted: 2024-05-22

## 2024-05-22 PROBLEM — S59.909A INJURY OF ELBOW: Status: ACTIVE | Noted: 2024-05-22

## 2024-05-22 PROBLEM — I69.398 ALTERATION OF SENSATION AS LATE EFFECT OF CEREBROVASCULAR ACCIDENT (CVA): Status: ACTIVE | Noted: 2024-05-22

## 2024-05-22 PROBLEM — H43.399 VITREOUS OPACITIES: Status: ACTIVE | Noted: 2024-05-22

## 2024-05-22 PROBLEM — B35.1 ONYCHOMYCOSIS: Status: ACTIVE | Noted: 2024-05-22

## 2024-05-22 PROBLEM — R39.9 LOWER URINARY TRACT SYMPTOMS: Status: ACTIVE | Noted: 2024-05-22

## 2024-05-22 PROBLEM — R25.2 SPASTICITY DUE TO OLD STROKE: Status: ACTIVE | Noted: 2024-05-22

## 2024-05-22 PROBLEM — R07.9 CHEST PAIN: Status: ACTIVE | Noted: 2024-05-22

## 2024-05-22 PROBLEM — M79.10 MUSCLE PAIN: Status: ACTIVE | Noted: 2024-05-22

## 2024-05-22 PROBLEM — N50.9 DISORDER OF MALE GENITAL ORGAN: Status: ACTIVE | Noted: 2024-05-22
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
## 2024-05-22 PROBLEM — I10 HYPERTENSIVE DISORDER: Status: ACTIVE | Noted: 2018-08-28

## 2024-05-22 PROBLEM — Z86.69 HISTORY OF VISUAL DISTURBANCE: Status: ACTIVE | Noted: 2024-05-22

## 2024-05-22 PROBLEM — N39.0 URINARY TRACT INFECTIOUS DISEASE: Status: ACTIVE | Noted: 2024-05-22

## 2024-05-22 PROBLEM — R10.9 ABDOMINAL PAIN: Status: ACTIVE | Noted: 2024-05-22

## 2024-05-22 PROBLEM — L60.3 DYSTROPHIC NAIL: Status: ACTIVE | Noted: 2024-05-22

## 2024-05-22 PROBLEM — I63.9 ISCHEMIC STROKE (MULTI): Status: ACTIVE | Noted: 2024-05-22

## 2024-05-22 PROBLEM — R20.9 ALTERATION OF SENSATION AS LATE EFFECT OF CEREBROVASCULAR ACCIDENT (CVA): Status: ACTIVE | Noted: 2024-05-22

## 2024-05-22 PROBLEM — Z98.890 HISTORY OF CARDIOVASCULAR SURGERY: Status: ACTIVE | Noted: 2024-05-22

## 2024-05-22 PROBLEM — J06.9 ACUTE UPPER RESPIRATORY INFECTION: Status: ACTIVE | Noted: 2024-05-22

## 2024-05-22 PROBLEM — Z86.73 HISTORY OF CEREBROVASCULAR ACCIDENT (CVA) DUE TO ISCHEMIA: Status: ACTIVE | Noted: 2024-05-22

## 2024-05-23 ENCOUNTER — TELEPHONE (OUTPATIENT)
Dept: CARDIOLOGY | Facility: CLINIC | Age: 79
End: 2024-05-23

## 2024-05-23 ENCOUNTER — OFFICE VISIT (OUTPATIENT)
Dept: PRIMARY CARE | Facility: CLINIC | Age: 79
End: 2024-05-23
Payer: MEDICARE

## 2024-05-23 ENCOUNTER — OFFICE VISIT (OUTPATIENT)
Dept: CARDIOLOGY | Facility: CLINIC | Age: 79
End: 2024-05-23
Payer: MEDICARE

## 2024-05-23 VITALS
TEMPERATURE: 97.5 F | BODY MASS INDEX: 28.42 KG/M2 | DIASTOLIC BLOOD PRESSURE: 74 MMHG | HEIGHT: 71 IN | HEART RATE: 58 BPM | SYSTOLIC BLOOD PRESSURE: 112 MMHG | WEIGHT: 203 LBS

## 2024-05-23 VITALS
SYSTOLIC BLOOD PRESSURE: 112 MMHG | HEART RATE: 58 BPM | OXYGEN SATURATION: 98 % | HEIGHT: 70 IN | DIASTOLIC BLOOD PRESSURE: 74 MMHG | BODY MASS INDEX: 29.06 KG/M2 | WEIGHT: 203 LBS

## 2024-05-23 DIAGNOSIS — Z00.00 WELLNESS EXAMINATION: Primary | ICD-10-CM

## 2024-05-23 DIAGNOSIS — I48.20 CHRONIC ATRIAL FIBRILLATION (MULTI): Primary | ICD-10-CM

## 2024-05-23 DIAGNOSIS — E66.3 OVERWEIGHT (BMI 25.0-29.9): ICD-10-CM

## 2024-05-23 DIAGNOSIS — I10 PRIMARY HYPERTENSION: ICD-10-CM

## 2024-05-23 DIAGNOSIS — R73.9 HYPERGLYCEMIA: ICD-10-CM

## 2024-05-23 DIAGNOSIS — E78.2 MIXED HYPERLIPIDEMIA: ICD-10-CM

## 2024-05-23 DIAGNOSIS — F32.A CHRONIC DEPRESSION: ICD-10-CM

## 2024-05-23 DIAGNOSIS — E55.9 VITAMIN D DEFICIENCY: ICD-10-CM

## 2024-05-23 DIAGNOSIS — I48.19 PERSISTENT ATRIAL FIBRILLATION (MULTI): ICD-10-CM

## 2024-05-23 PROCEDURE — 1126F AMNT PAIN NOTED NONE PRSNT: CPT | Performed by: INTERNAL MEDICINE

## 2024-05-23 PROCEDURE — 99213 OFFICE O/P EST LOW 20 MIN: CPT | Performed by: INTERNAL MEDICINE

## 2024-05-23 PROCEDURE — 3078F DIAST BP <80 MM HG: CPT | Performed by: INTERNAL MEDICINE

## 2024-05-23 PROCEDURE — 1170F FXNL STATUS ASSESSED: CPT | Performed by: INTERNAL MEDICINE

## 2024-05-23 PROCEDURE — 1036F TOBACCO NON-USER: CPT | Performed by: INTERNAL MEDICINE

## 2024-05-23 PROCEDURE — 1159F MED LIST DOCD IN RCRD: CPT | Performed by: INTERNAL MEDICINE

## 2024-05-23 PROCEDURE — 3074F SYST BP LT 130 MM HG: CPT | Performed by: INTERNAL MEDICINE

## 2024-05-23 PROCEDURE — 99397 PER PM REEVAL EST PAT 65+ YR: CPT | Performed by: INTERNAL MEDICINE

## 2024-05-23 RX ORDER — WARFARIN 6 MG/1
6 TABLET ORAL SEE ADMIN INSTRUCTIONS
Qty: 100 TABLET | Refills: 3 | Status: SHIPPED | OUTPATIENT
Start: 2024-05-23 | End: 2025-05-23

## 2024-05-23 RX ORDER — METOPROLOL TARTRATE 50 MG/1
50 TABLET ORAL 2 TIMES DAILY
Qty: 180 TABLET | Refills: 1 | Status: SHIPPED | OUTPATIENT
Start: 2024-05-23

## 2024-05-23 RX ORDER — DULOXETIN HYDROCHLORIDE 30 MG/1
30 CAPSULE, DELAYED RELEASE ORAL DAILY
Qty: 90 CAPSULE | Refills: 1 | Status: SHIPPED | OUTPATIENT
Start: 2024-05-23

## 2024-05-23 ASSESSMENT — ACTIVITIES OF DAILY LIVING (ADL)
GROCERY_SHOPPING: INDEPENDENT
MANAGING_FINANCES: INDEPENDENT
DOING_HOUSEWORK: INDEPENDENT
BATHING: INDEPENDENT
TAKING_MEDICATION: INDEPENDENT
DRESSING: INDEPENDENT

## 2024-05-23 ASSESSMENT — PATIENT HEALTH QUESTIONNAIRE - PHQ9
SUM OF ALL RESPONSES TO PHQ9 QUESTIONS 1 AND 2: 0
1. LITTLE INTEREST OR PLEASURE IN DOING THINGS: NOT AT ALL
2. FEELING DOWN, DEPRESSED OR HOPELESS: NOT AT ALL

## 2024-05-23 ASSESSMENT — ENCOUNTER SYMPTOMS
LOSS OF SENSATION IN FEET: 0
DEPRESSION: 0
OCCASIONAL FEELINGS OF UNSTEADINESS: 0

## 2024-05-23 ASSESSMENT — PAIN SCALES - GENERAL: PAINLEVEL: 0-NO PAIN

## 2024-05-23 NOTE — PROGRESS NOTES
· HTN (hypertension) (401.9) (I10)   · Atrial fibrillation (427.31) (I48.91)   . CVA Right MCA, left hemiparesis    Patient is a pleasant 78-year-old male with the above-noted pertinent past medical history who presents today for routine follow-up, patient is accompanied by his wife, he has no complaints of exertional chest pain or shortness of breath, he has fairly dense left-sided hemiparesis that makes ambulation difficult for him yet he states that he tries to go for walk to his mailbox he states about 2-300 yards at least twice a week, he also tries to ambulates to the bathroom that he states is about 22 feet at home, he has no complaints orthopnea PND palpitation or syncopal episode    Vital signs reviewed and stable BMI 28.6 blood pressure 112/74 mmHg and heart rate of 58 bpm patient is a well-developed well-nourished male in no acute distress speaking full sentences no carotid bruits upstroke and volumes are normal heart irregularly irregular S1 variable S2 is normal no gallop or murmurs appreciated lungs decreased breath sound clear, abdomen is obese positive bowel sounds soft and nontender    5/2024  A1c 6.0%  Sodium 141, potassium 3.9, BUN 14, creatinine 0.9 with normal liver function test protein levels normal 7 January 2024 total cholesterol 120, triglycerides 72, HDL low at 38 LDL 68    Atrial fibrillation [id number] score at least 5 (hypertension, age greater than 75, and prior stroke) will continue with anticoagulation and rate control, patient and discussed the use of DOACs however patient wishes to remain warfarin, medication list is reviewed and refills was provided  Hypertension under good control  CVA with left hemiparesis with increased rigidity and muscle tone which makes walking some days very difficult for him follow-up per primary care physician and neurology  Patient is a non-smoker  Return to my clinic in 6 months.

## 2024-05-23 NOTE — PROGRESS NOTES
Internal Medicine Outpatient Visit  Chief Complaint   Patient presents with    Medicare Annual Wellness Visit Subsequent     Awv, med refill       HPI: Jose Angel Riojas is of 78 y.o. who is here for Internal Visit for the following issues:  Patient is seen my office today for annual wellness examination and for follow-up of his medical problems including hypertension, hyperglycemia, vitamin D deficiency and other medical problems.  He also needs a refill on some of his prescriptions.  He denies any fever chill Anexsia.  Has no headache or visual disturbances.  Has no chest pain or palpitation.  Denies any shortness of breath or wheezing.  Has no nausea matting pain abdomen.  He has not noticed any blood in urine or stool.  His depression symptoms are stable on current dose of Cymbalta.  Review of other systems are negative.    Past Surgical History:   Procedure Laterality Date    MR HEAD ANGIO WO IV CONTRAST  6/29/2016    MR HEAD ANGIO WO IV CONTRAST 6/29/2016 [email or web address] CL[facility]ICAL LEGACY    MR NECK ANGIO WO IV CONTRAST  6/29/2016    MR NECK ANGIO WO IV CONTRAST 6/29/2016 [facility] [[email or web address] CLINICAL LEGACY    OTHER SURGICAL HISTORY  12/14/2021    Oral surgery    OTHER SURGICAL HISTORY  12/14/2021    Root canal procedure    OTHER SURGICAL HISTORY  11/23/2016    Cranioplasty    OTHER SURGICAL HISTORY  08/18/2016    Craniotomy (Therapeutic)    VASECTOMY  06/25/2014    Surgery Vas Deferens Vasectomy       Family History   Problem Relation Name Age of Onset    Diabetes Mother      Cerebral aneurysm Father      Valvular heart disease Father      Coronary artery disease Brother      Heart attack Brother           Current Outpatient Medications on File Prior to Visit   Medication Sig Dispense Refill    amLODIPine (Norvasc) 5 mg tablet Take 1 tablet (5 mg) by mouth once daily. 90 tablet 3    aspirin 81 mg EC tablet Take 1 tablet (81 mg) by mouth once daily.      atorvastatin (Lipitor) 40 mg tablet Take 1 tablet (40 mg) by mouth once daily. 90  "tablet 1    baclofen (Lioresal) 10 mg tablet Take 1.5 tablets (15 mg) by mouth 3 times a day.      cholecalciferol (Vitamin D3) 50 mcg (2,000 unit) capsule Take 5,000 Units by mouth early in the morning..      docusate sodium (Colace) 100 mg capsule Take 1 capsule (100 mg) by mouth 2 times a day as needed.      doxazosin (Cardura) 1 mg tablet Take 1 tablet (1 mg) by mouth once daily at bedtime.      levETIRAcetam (Keppra) 750 mg tablet Take 1 tablet (750 mg) by mouth 2 times a day.      lisinopril 40 mg tablet Take 1 tablet (40 mg) by mouth once daily. as directed 90 tablet 1    tamsulosin (Flomax) 0.4 mg 24 hr capsule Take 1 capsule (0.4 mg) by mouth once daily. 90 capsule 1    traZODone (Desyrel) 50 mg tablet Take 1 tablet (50 mg) by mouth once daily.      warfarin (Coumadin) 4 mg tablet Take by mouth. Take as directed per After Visit Summary.      warfarin (Coumadin) 5 mg tablet Take 1 tablet (5 mg) by mouth once daily.      [DISCONTINUED] DULoxetine (Cymbalta) 30 mg DR capsule Take 1 capsule (30 mg) by mouth once daily. 90 capsule 1    [DISCONTINUED] metoprolol tartrate (Lopressor) 50 mg tablet Take 1 tablet by mouth 2 times a day.      [DISCONTINUED] warfarin (Coumadin) 6 mg tablet Take 1 tablet (6 mg) by mouth once daily.      warfarin (Coumadin) 6 mg tablet Take 1 tablet (6 mg) by mouth see administration instructions. 100 tablet 3     No current facility-administered medications on file prior to visit.       Blood pressure 112/74, pulse 58, height 1.778 m (5' 10\"), weight 92.1 kg (203 lb), SpO2 98%.  Body mass index is 29.13 kg/m².  General examination: There is no acute discomfort  Eyes: There is no pallor or jaundice external ocular movements are normal  Oral cavity throat normal  Neck: There is no carotid bruit or JVD  Lungs: Clear to auscultation  CVS: Rhythm is atrial fibrillation: No murmur or gallop is noted  Abdomen: There is no tenderness bowel sounds are normal  CNS: Left-sided weakness is " present  Legs: Ankle edema is present more marked on the left side.  Skin: No rash    1. Wellness examination  Physical examination is remarkable for presence of atrial fibrillation, being overweight and left-sided hemiparesis.  Diet exercise activity reviewed with the patient.  He is result of the blood test which was done on the 16th of this month is reviewed with the patient.  He had a colonoscopy done in 2015 he was advised he will need another one in 10 years that should be next year.  He goes to his urologist Dr. Thomas for prostate check.  - Lipid panel; Future    2. Primary hypertension  Controlled.  Prescription for metoprolol renewed  - metoprolol tartrate (Lopressor) 50 mg tablet; Take 1 tablet by mouth 2 times a day.  Dispense: 180 tablet; Refill: 1  - Comprehensive metabolic panel; Future  - Lipid panel; Future    3. Hyperglycemia  Hemoglobin A1c this month is 6.0  - Comprehensive metabolic panel; Future  - Lipid panel; Future    4. Persistent atrial fibrillation (Multi)  Is rate controlled.  He is being followed by his cardiologist   - Comprehensive metabolic panel; Future  - CBC and Auto Differential; Future    5. Vitamin D deficiency  Vitamin D level is normal on current supplement  - Vitamin D 25-Hydroxy,Total (for eval of Vitamin D levels); Future    6.  Overweight: Diet excise activity reviewed with the patient    7.  Chronic depression  Is stable.  Prescription for duloxetine is renewed  - DULoxetine (Cymbalta) 30 mg DR capsule; Take 1 capsule (30 mg) by mouth once daily.  Dispense: 90 capsule; Refill: 1    Recommend follow-up in 4 months

## 2024-06-13 ENCOUNTER — ANTICOAGULATION - WARFARIN VISIT (OUTPATIENT)
Dept: CARDIOLOGY | Facility: CLINIC | Age: 79
End: 2024-06-13
Payer: MEDICARE

## 2024-06-13 DIAGNOSIS — I48.20 CHRONIC ATRIAL FIBRILLATION (MULTI): ICD-10-CM

## 2024-06-13 LAB
NON-UH HIE INR: 2.6
NON-UH HIE PROTIME PATIENT: 29.1 SECONDS (ref 9.8–12.8)

## 2024-06-17 LAB
INR IN PPP BY COAGULATION ASSAY EXTERNAL: 2.6
POC INR: 2.6 (ref 2–3)
POC PROTHROMBIN TIME: NORMAL
PROTHROMBIN TIME (PT) IN PPP BY COAGULATION ASSAY EXTERNAL: NORMAL SECONDS

## 2024-07-11 ENCOUNTER — ANTICOAGULATION - WARFARIN VISIT (OUTPATIENT)
Dept: CARDIOLOGY | Facility: CLINIC | Age: 79
End: 2024-07-11
Payer: MEDICARE

## 2024-07-11 DIAGNOSIS — I48.19 PERSISTENT ATRIAL FIBRILLATION (MULTI): ICD-10-CM

## 2024-07-11 LAB
NON-UH HIE INR: 2.1
NON-UH HIE PROTIME PATIENT: 23.8 SECONDS (ref 9.8–12.8)

## 2024-07-12 PROBLEM — D22.72 MELANOCYTIC NEVI OF LEFT LOWER LIMB, INCLUDING HIP: Status: ACTIVE | Noted: 2022-12-05

## 2024-07-12 LAB
POC INR: 2.1 (ref 2–3)
POC PROTHROMBIN TIME: NORMAL

## 2024-08-07 ENCOUNTER — ANTICOAGULATION - WARFARIN VISIT (OUTPATIENT)
Dept: CARDIOLOGY | Facility: CLINIC | Age: 79
End: 2024-08-07
Payer: MEDICARE

## 2024-08-07 DIAGNOSIS — I48.20 CHRONIC ATRIAL FIBRILLATION (MULTI): ICD-10-CM

## 2024-08-07 LAB
NON-UH HIE INR: 2.1
NON-UH HIE PROTIME PATIENT: 23.5 SECONDS (ref 9.8–12.8)

## 2024-08-09 LAB
INR IN PPP BY COAGULATION ASSAY EXTERNAL: 2.1 (ref 2–3)
PROTHROMBIN TIME (PT) IN PPP BY COAGULATION ASSAY EXTERNAL: NORMAL

## 2024-09-03 DIAGNOSIS — N40.0 BENIGN PROSTATIC HYPERPLASIA WITHOUT LOWER URINARY TRACT SYMPTOMS: ICD-10-CM

## 2024-09-03 DIAGNOSIS — I10 PRIMARY HYPERTENSION: ICD-10-CM

## 2024-09-03 DIAGNOSIS — E78.00 PURE HYPERCHOLESTEROLEMIA: ICD-10-CM

## 2024-09-04 ENCOUNTER — ANTICOAGULATION - WARFARIN VISIT (OUTPATIENT)
Dept: CARDIOLOGY | Facility: CLINIC | Age: 79
End: 2024-09-04
Payer: MEDICARE

## 2024-09-04 DIAGNOSIS — I48.20 CHRONIC ATRIAL FIBRILLATION (MULTI): ICD-10-CM

## 2024-09-04 LAB
NON-UH HIE INR: 2.1
NON-UH HIE PROTIME PATIENT: 24.4 SECONDS (ref 9.8–12.8)

## 2024-09-04 RX ORDER — ATORVASTATIN CALCIUM 40 MG/1
40 TABLET, FILM COATED ORAL DAILY
Qty: 90 TABLET | Refills: 0 | Status: SHIPPED | OUTPATIENT
Start: 2024-09-04

## 2024-09-04 RX ORDER — TAMSULOSIN HYDROCHLORIDE 0.4 MG/1
0.4 CAPSULE ORAL DAILY
Qty: 90 CAPSULE | Refills: 0 | Status: SHIPPED | OUTPATIENT
Start: 2024-09-04

## 2024-09-04 RX ORDER — LISINOPRIL 40 MG/1
40 TABLET ORAL DAILY
Qty: 90 TABLET | Refills: 0 | Status: SHIPPED | OUTPATIENT
Start: 2024-09-04

## 2024-09-10 ENCOUNTER — TELEPHONE (OUTPATIENT)
Dept: PRIMARY CARE | Facility: CLINIC | Age: 79
End: 2024-09-10
Payer: MEDICARE

## 2024-09-10 LAB
NON-UH HIE A/G RATIO: 1.1
NON-UH HIE ABSOLUTE BAND CT: 0 X1000 (ref 0–0.7)
NON-UH HIE ABSOLUTE LYMPH CT: 1.64 X1000 (ref 1.2–4.8)
NON-UH HIE ABSOLUTE MONO CT: 0.9 X1000 (ref 0.1–1)
NON-UH HIE ABSOLUTE NEUTROPHIL CT: 1.56 X1000 (ref 1.4–8.8)
NON-UH HIE ABSOLUTE SEG CT: 1.56 X1000 (ref 1.4–8.8)
NON-UH HIE ALB: 3.9 G/DL (ref 3.4–5)
NON-UH HIE ALK PHOS: 80 UNIT/L (ref 45–117)
NON-UH HIE BAND %: 0 %
NON-UH HIE BILIRUBIN, TOTAL: 1.3 MG/DL (ref 0.3–1.2)
NON-UH HIE BUN/CREAT RATIO: 13
NON-UH HIE BUN: 13 MG/DL (ref 9–23)
NON-UH HIE CALCIUM: 9.3 MG/DL (ref 8.7–10.4)
NON-UH HIE CALCULATED LDL CHOLESTEROL: 60 MG/DL (ref 60–130)
NON-UH HIE CALCULATED OSMOLALITY: 279 MOSM/KG (ref 275–295)
NON-UH HIE CHLORIDE: 106 MMOL/L (ref 98–107)
NON-UH HIE CHOLESTEROL: 111 MG/DL (ref 100–200)
NON-UH HIE CO2, VENOUS: 28 MMOL/L (ref 20–31)
NON-UH HIE CREATININE: 1 MG/DL (ref 0.6–1.1)
NON-UH HIE DIFF?: YES
NON-UH HIE DXH ACTIONS: ABNORMAL
NON-UH HIE GFR AA: >60
NON-UH HIE GLOBULIN: 3.5 G/DL
NON-UH HIE GLOMERULAR FILTRATION RATE: >60 ML/MIN/1.73M?
NON-UH HIE GLUCOSE: 92 MG/DL (ref 74–106)
NON-UH HIE GOT: 15 UNIT/L (ref 15–37)
NON-UH HIE GPT: 16 UNIT/L (ref 10–49)
NON-UH HIE HCT: 39.9 % (ref 41–52)
NON-UH HIE HDL CHOLESTEROL: 37 MG/DL (ref 40–60)
NON-UH HIE HGB: 13.5 G/DL (ref 13.5–17.5)
NON-UH HIE INSTR WBC: 4.1
NON-UH HIE K: 3.8 MMOL/L (ref 3.5–5.1)
NON-UH HIE LYMPHOCYTE %: 40 %
NON-UH HIE MCH: 31 PG (ref 27–34)
NON-UH HIE MCHC: 33.9 G/DL (ref 32–37)
NON-UH HIE MCV: 91.5 FL (ref 80–100)
NON-UH HIE MONOCYTE %: 22 %
NON-UH HIE MPV: 9.8 FL (ref 7.4–10.4)
NON-UH HIE NA: 140 MMOL/L (ref 135–145)
NON-UH HIE NUCLEATED RBC: 0 /100WBC
NON-UH HIE OVALOCYTES: SLIGHT
NON-UH HIE PLATELET: 147 X10 (ref 150–450)
NON-UH HIE POIKILOCYTOSIS: SLIGHT
NON-UH HIE RBC: 4.36 X10 (ref 4.7–6.1)
NON-UH HIE RDW: 13.8 % (ref 11.5–14.5)
NON-UH HIE SEGMENTED NEUT %: 38 %
NON-UH HIE SPECIAL NOTES: ABNORMAL
NON-UH HIE TOTAL CHOL/HDL CHOL RATIO: 3
NON-UH HIE TOTAL PROTEIN: 7.4 G/DL (ref 5.7–8.2)
NON-UH HIE TRIGLYCERIDES: 70 MG/DL (ref 30–150)
NON-UH HIE VIT D 25: 74 NG/ML
NON-UH HIE WBC: 4.1 X10 (ref 4.5–11)

## 2024-09-10 NOTE — TELEPHONE ENCOUNTER
Talked with patients wife, and she is aware of his results.     ----- Message from Tim Castillo sent at 9/10/2024  1:55 PM EDT -----  Slightly low platelet count as before.  Other results are acceptable  ----- Message -----  [facility]  Sent: 9/10/2024   1:40 PM EDT  To: Tim Castillo MD

## 2024-09-16 ENCOUNTER — APPOINTMENT (OUTPATIENT)
Dept: PRIMARY CARE | Facility: CLINIC | Age: 79
End: 2024-09-16
Payer: MEDICARE

## 2024-09-16 VITALS — HEIGHT: 70 IN | WEIGHT: 200 LBS | BODY MASS INDEX: 28.63 KG/M2

## 2024-09-16 DIAGNOSIS — I10 PRIMARY HYPERTENSION: Primary | ICD-10-CM

## 2024-09-16 DIAGNOSIS — G81.94 LEFT HEMIPARESIS (MULTI): ICD-10-CM

## 2024-09-16 DIAGNOSIS — F32.A CHRONIC DEPRESSION: ICD-10-CM

## 2024-09-16 DIAGNOSIS — E78.00 PURE HYPERCHOLESTEROLEMIA: ICD-10-CM

## 2024-09-16 DIAGNOSIS — E55.9 VITAMIN D DEFICIENCY: ICD-10-CM

## 2024-09-16 DIAGNOSIS — R73.9 HYPERGLYCEMIA: ICD-10-CM

## 2024-09-16 DIAGNOSIS — N40.0 BENIGN PROSTATIC HYPERPLASIA WITHOUT LOWER URINARY TRACT SYMPTOMS: ICD-10-CM

## 2024-09-16 DIAGNOSIS — I48.19 PERSISTENT ATRIAL FIBRILLATION (MULTI): ICD-10-CM

## 2024-09-16 PROCEDURE — 1123F ACP DISCUSS/DSCN MKR DOCD: CPT | Performed by: INTERNAL MEDICINE

## 2024-09-16 PROCEDURE — 99214 OFFICE O/P EST MOD 30 MIN: CPT | Performed by: INTERNAL MEDICINE

## 2024-09-16 PROCEDURE — 1159F MED LIST DOCD IN RCRD: CPT | Performed by: INTERNAL MEDICINE

## 2024-09-16 PROCEDURE — 1158F ADVNC CARE PLAN TLK DOCD: CPT | Performed by: INTERNAL MEDICINE

## 2024-09-16 PROCEDURE — 90662 IIV NO PRSV INCREASED AG IM: CPT | Performed by: INTERNAL MEDICINE

## 2024-09-16 PROCEDURE — 1036F TOBACCO NON-USER: CPT | Performed by: INTERNAL MEDICINE

## 2024-09-16 PROCEDURE — G0008 ADMIN INFLUENZA VIRUS VAC: HCPCS | Performed by: INTERNAL MEDICINE

## 2024-09-16 RX ORDER — ATORVASTATIN CALCIUM 40 MG/1
40 TABLET, FILM COATED ORAL DAILY
Qty: 90 TABLET | Refills: 1 | Status: SHIPPED | OUTPATIENT
Start: 2024-09-16

## 2024-09-16 RX ORDER — DULOXETIN HYDROCHLORIDE 30 MG/1
30 CAPSULE, DELAYED RELEASE ORAL DAILY
Qty: 90 CAPSULE | Refills: 1 | Status: SHIPPED | OUTPATIENT
Start: 2024-09-16

## 2024-09-16 RX ORDER — METOPROLOL TARTRATE 50 MG/1
50 TABLET ORAL 2 TIMES DAILY
Qty: 180 TABLET | Refills: 1 | Status: SHIPPED | OUTPATIENT
Start: 2024-09-16

## 2024-09-16 RX ORDER — LISINOPRIL 40 MG/1
40 TABLET ORAL DAILY
Qty: 90 TABLET | Refills: 1 | Status: SHIPPED | OUTPATIENT
Start: 2024-09-16

## 2024-09-16 RX ORDER — TAMSULOSIN HYDROCHLORIDE 0.4 MG/1
0.4 CAPSULE ORAL DAILY
Qty: 90 CAPSULE | Refills: 1 | Status: SHIPPED | OUTPATIENT
Start: 2024-09-16

## 2024-09-16 RX ORDER — AMLODIPINE BESYLATE 5 MG/1
5 TABLET ORAL DAILY
Qty: 90 TABLET | Refills: 1 | Status: SHIPPED | OUTPATIENT
Start: 2024-09-16 | End: 2025-09-16

## 2024-09-16 ASSESSMENT — PATIENT HEALTH QUESTIONNAIRE - PHQ9
SUM OF ALL RESPONSES TO PHQ9 QUESTIONS 1 & 2: 0
2. FEELING DOWN, DEPRESSED OR HOPELESS: NOT AT ALL
1. LITTLE INTEREST OR PLEASURE IN DOING THINGS: NOT AT ALL

## 2024-09-16 ASSESSMENT — ENCOUNTER SYMPTOMS
OCCASIONAL FEELINGS OF UNSTEADINESS: 0
DEPRESSION: 0
LOSS OF SENSATION IN FEET: 0

## 2024-09-16 NOTE — PROGRESS NOTES
Internal Medicine Outpatient Visit  Chief Complaint   Patient presents with    Follow-up     4 month follow up, med refill       HPI: Jose Angel Riojas is of 79 y.o. who is here for Internal Visit for the following issues:  Patient is seen office today for for follow-up of his medical problems including hypertension, hyperlipidemia, hyperglycemia, vitamin D deficiency, chronic constipation and other medical problems.  He denies any fever chill Anexsia.  Has some pain in the neck radiating to the arms.  He was diagnosed with peripheral neuropathy by his neurologist in the past.  He was on gabapentin for some time.  He denies any new onset weakness of any extremity he has chronic left-sided weakness.  Denies any chest pain or palpitation.  Has no shortness of breath or wheezing.  Has nausea matting pain abdomen.  He has less frequent bowel movements.  He is chronically taking Colace.  He denies any polyuria, polydipsia any other symptom uncontrolled hyperglycemia.  Review of other systems are negative.    Past Surgical History:   Procedure Laterality Date    MR HEAD ANGIO WO IV CONTRAST  6/29/2016    MR HEAD ANGIO WO IV CONTRAST 6/29/2016 [facility]    MR NECK ANGIO WO IV CONTRAST  6/29/2016    MR NECK ANGIO WO IV CONTRAST 6/29/2016 [facility]    OTHER SURGICAL HISTORY  12/14/2021    Oral surgery    OTHER SURGICAL HISTORY  12/14/2021    Root canal procedure    OTHER SURGICAL HISTORY  11/23/2016    Cranioplasty    OTHER SURGICAL HISTORY  08/18/2016    Craniotomy (Therapeutic)    VASECTOMY  06/25/2014    Surgery Vas Deferens Vasectomy       Family History   Problem Relation Name Age of Onset    Diabetes Mother      Cerebral aneurysm Father      Valvular heart disease Father      Coronary artery disease Brother      Heart attack Brother           Current Outpatient Medications on File Prior to Visit   Medication Sig Dispense Refill    aspirin 81 mg EC tablet Take 1 tablet (81 mg) by mouth once daily.    "   baclofen (Lioresal) 10 mg tablet Take 1.5 tablets (15 mg) by mouth 3 times a day.      cholecalciferol (Vitamin D3) 50 mcg (2,000 unit) capsule Take 5,000 Units by mouth early in the morning..      docusate sodium (Colace) 100 mg capsule Take 1 capsule (100 mg) by mouth 2 times a day as needed.      doxazosin (Cardura) 1 mg tablet Take 1 tablet (1 mg) by mouth once daily at bedtime.      levETIRAcetam (Keppra) 750 mg tablet Take 1 tablet (750 mg) by mouth 2 times a day.      traZODone (Desyrel) 50 mg tablet Take 1 tablet (50 mg) by mouth once daily.      warfarin (Coumadin) 4 mg tablet Take by mouth. Take as directed per After Visit Summary.      warfarin (Coumadin) 5 mg tablet Take 1 tablet (5 mg) by mouth once daily.      warfarin (Coumadin) 6 mg tablet Take 1 tablet (6 mg) by mouth see administration instructions. 100 tablet 3    [DISCONTINUED] amLODIPine (Norvasc) 5 mg tablet Take 1 tablet (5 mg) by mouth once daily. 90 tablet 3    [DISCONTINUED] atorvastatin (Lipitor) 40 mg tablet Take 1 tablet by mouth once daily 90 tablet 0    [DISCONTINUED] DULoxetine (Cymbalta) 30 mg DR capsule Take 1 capsule (30 mg) by mouth once daily. 90 capsule 1    [DISCONTINUED] lisinopril 40 mg tablet TAKE 1 TABLET BY MOUTH ONCE DAILY AS DIRECTED 90 tablet 0    [DISCONTINUED] metoprolol tartrate (Lopressor) 50 mg tablet Take 1 tablet by mouth 2 times a day. 180 tablet 1    [DISCONTINUED] tamsulosin (Flomax) 0.4 mg 24 hr capsule Take 1 capsule by mouth once daily 90 capsule 0     No current facility-administered medications on file prior to visit.       Height 1.778 m (5' 10\"), weight 90.7 kg (200 lb).  Body mass index is 28.7 kg/m².  General examination: There is no acute discomfort  Eyes: There is no pallor or jaundice pupils are equal and reactive to light  Neck: There is no carotid bruit or JVD.  Movements of cervical is fine is slightly restricted.  Sensations in upper extremity and normal.  Power in the left upper extremity " is normal.  Lungs: Clear to auscultation  CVS: Rhythm is irregularly irregular.  Soft systolic murmur is noted in upper parasternal area  Abdomen: Soft there is no tenderness  CNS: Left-sided hemiparesis is present.  Musculoskeletal system there is no joint swelling  Legs: Trace of ankle edema is present    Assessment and plan:    1. Primary hypertension  Controlled.  Prescription for amlodipine and lisinopril is renewed  - amLODIPine (Norvasc) 5 mg tablet; Take 1 tablet (5 mg) by mouth once daily.  Dispense: 90 tablet; Refill: 1  - lisinopril 40 mg tablet; Take 1 tablet (40 mg) by mouth once daily. as directed  Dispense: 90 tablet; Refill: 1  - metoprolol tartrate (Lopressor) 50 mg tablet; Take 1 tablet by mouth 2 times a day.  Dispense: 180 tablet; Refill: 1  - Comprehensive metabolic panel; Future  - CBC and Auto Differential; Future  - Hemoglobin A1c; Future    2. Hyperglycemia  Recent blood sugar levels are normal  - Comprehensive metabolic panel; Future  - CBC and Auto Differential; Future  - Hemoglobin A1c; Future    3. Pure hypercholesterolemia  - atorvastatin (Lipitor) 40 mg tablet; Take 1 tablet (40 mg) by mouth once daily.  Dispense: 90 tablet; Refill: 1  - Comprehensive metabolic panel; Future  - CBC and Auto Differential; Future  - Hemoglobin A1c; Future    4. Vitamin D deficiency  Vitamin D level is 74 on current supplement  - Comprehensive metabolic panel; Future  - CBC and Auto Differential; Future  - Hemoglobin A1c; Future    5. Persistent atrial fibrillation (Multi)  Patient is anticoagulated.  He is being followed by his cardiologist  - amLODIPine (Norvasc) 5 mg tablet; Take 1 tablet (5 mg) by mouth once daily.  Dispense: 90 tablet; Refill: 1  - Comprehensive metabolic panel; Future  - CBC and Auto Differential; Future  - Hemoglobin A1c; Future    6. Left hemiparesis (Multi)  Supportive care.  Because of the neck pain and arm pain I did offer to do a x-ray of the cervical spine however the patient  is planning to make an appointment with his neurologist Dr. Espinoza to discuss  - Comprehensive metabolic panel; Future  - CBC and Auto Differential; Future  - Hemoglobin A1c; Future    7. BMI 29.0-29.9,adult  Patient is watching his diet.  Exercise capacity is limited  - Comprehensive metabolic panel; Future  - CBC and Auto Differential; Future  - Hemoglobin A1c; Future    8. Chronic depression  Prescription for Cymbalta is renewed  - DULoxetine (Cymbalta) 30 mg DR capsule; Take 1 capsule (30 mg) by mouth once daily.  Dispense: 90 capsule; Refill: 1  - Comprehensive metabolic panel; Future  - CBC and Auto Differential; Future  - Hemoglobin A1c; Future    9. Benign prostatic hyperplasia without lower urinary tract symptoms  Patient is being followed by his urologist Dr. Ro.  His recent PSA was borderline elevated and he has a follow-up appointment with his urologist in December  - tamsulosin (Flomax) 0.4 mg 24 hr capsule; Take 1 capsule (0.4 mg) by mouth once daily.  Dispense: 90 capsule; Refill: 1  - Comprehensive metabolic panel; Future  - CBC and Auto Differential; Future  - Hemoglobin A1c; Future    Will be seen in office in 4 months, earlier if needed

## 2024-10-01 ENCOUNTER — ANTICOAGULATION - WARFARIN VISIT (OUTPATIENT)
Dept: PRIMARY CARE | Facility: CLINIC | Age: 79
End: 2024-10-01
Payer: MEDICARE

## 2024-10-01 DIAGNOSIS — I48.19 PERSISTENT ATRIAL FIBRILLATION (MULTI): ICD-10-CM

## 2024-10-02 LAB
INR IN PPP BY COAGULATION ASSAY EXTERNAL: 2.2 (ref 2–3)
NON-UH HIE INR: 2.2
NON-UH HIE PROTIME PATIENT: 25.4 SECONDS (ref 9.8–12.8)
PROTHROMBIN TIME (PT) IN PPP BY COAGULATION ASSAY EXTERNAL: NORMAL

## 2024-11-05 LAB
NON-UH HIE INR: 2
NON-UH HIE PROTIME PATIENT: 23.1 SECONDS (ref 9.8–12.8)

## 2024-11-06 ENCOUNTER — ANTICOAGULATION - WARFARIN VISIT (OUTPATIENT)
Dept: CARDIOLOGY | Facility: CLINIC | Age: 79
End: 2024-11-06
Payer: MEDICARE

## 2024-11-06 LAB
INR IN PPP BY COAGULATION ASSAY EXTERNAL: 2 (ref 2–3)
PROTHROMBIN TIME (PT) IN PPP BY COAGULATION ASSAY EXTERNAL: NORMAL

## 2024-11-18 ENCOUNTER — APPOINTMENT (OUTPATIENT)
Dept: CARDIOLOGY | Facility: CLINIC | Age: 79
End: 2024-11-18
Payer: MEDICARE

## 2024-12-03 LAB
NON-UH HIE INR: 2.2
NON-UH HIE PROTIME PATIENT: 25 SECONDS (ref 9.8–12.8)

## 2024-12-04 ENCOUNTER — ANTICOAGULATION - WARFARIN VISIT (OUTPATIENT)
Dept: PRIMARY CARE | Facility: CLINIC | Age: 79
End: 2024-12-04
Payer: MEDICARE

## 2024-12-04 DIAGNOSIS — I48.19 PERSISTENT ATRIAL FIBRILLATION (MULTI): ICD-10-CM

## 2024-12-04 LAB
INR IN PPP BY COAGULATION ASSAY EXTERNAL: 2.2 (ref 2–3)
PROTHROMBIN TIME (PT) IN PPP BY COAGULATION ASSAY EXTERNAL: NORMAL

## 2024-12-05 ENCOUNTER — APPOINTMENT (OUTPATIENT)
Dept: CARDIOLOGY | Facility: CLINIC | Age: 79
End: 2024-12-05
Payer: MEDICARE

## 2024-12-05 VITALS
HEIGHT: 70 IN | SYSTOLIC BLOOD PRESSURE: 102 MMHG | HEART RATE: 55 BPM | BODY MASS INDEX: 28.7 KG/M2 | DIASTOLIC BLOOD PRESSURE: 68 MMHG | TEMPERATURE: 98 F

## 2024-12-05 DIAGNOSIS — E78.2 MIXED HYPERLIPIDEMIA: ICD-10-CM

## 2024-12-05 DIAGNOSIS — I48.20 CHRONIC ATRIAL FIBRILLATION (MULTI): ICD-10-CM

## 2024-12-05 DIAGNOSIS — I48.91 ATRIAL FIBRILLATION, UNSPECIFIED TYPE (MULTI): Primary | ICD-10-CM

## 2024-12-05 DIAGNOSIS — Z79.01 CHRONIC ANTICOAGULATION: ICD-10-CM

## 2024-12-05 DIAGNOSIS — I69.959 HEMIPLEGIA OF NONDOMINANT SIDE AS LATE EFFECT OF CEREBROVASCULAR DISEASE (MULTI): ICD-10-CM

## 2024-12-05 DIAGNOSIS — I10 ESSENTIAL HYPERTENSION: ICD-10-CM

## 2024-12-05 DIAGNOSIS — I10 PRIMARY HYPERTENSION: ICD-10-CM

## 2024-12-05 DIAGNOSIS — E66.3 OVERWEIGHT (BMI 25.0-29.9): ICD-10-CM

## 2024-12-05 PROBLEM — R07.9 CHEST PAIN: Status: RESOLVED | Noted: 2024-05-22 | Resolved: 2024-12-05

## 2024-12-05 PROBLEM — J06.9 ACUTE UPPER RESPIRATORY INFECTION: Status: RESOLVED | Noted: 2024-05-22 | Resolved: 2024-12-05

## 2024-12-05 PROCEDURE — 1036F TOBACCO NON-USER: CPT | Performed by: INTERNAL MEDICINE

## 2024-12-05 PROCEDURE — 1159F MED LIST DOCD IN RCRD: CPT | Performed by: INTERNAL MEDICINE

## 2024-12-05 PROCEDURE — 93000 ELECTROCARDIOGRAM COMPLETE: CPT | Performed by: INTERNAL MEDICINE

## 2024-12-05 PROCEDURE — 3078F DIAST BP <80 MM HG: CPT | Performed by: INTERNAL MEDICINE

## 2024-12-05 PROCEDURE — 3074F SYST BP LT 130 MM HG: CPT | Performed by: INTERNAL MEDICINE

## 2024-12-05 PROCEDURE — 1160F RVW MEDS BY RX/DR IN RCRD: CPT | Performed by: INTERNAL MEDICINE

## 2024-12-05 PROCEDURE — 1123F ACP DISCUSS/DSCN MKR DOCD: CPT | Performed by: INTERNAL MEDICINE

## 2024-12-05 PROCEDURE — G2211 COMPLEX E/M VISIT ADD ON: HCPCS | Performed by: INTERNAL MEDICINE

## 2024-12-05 PROCEDURE — 99214 OFFICE O/P EST MOD 30 MIN: CPT | Performed by: INTERNAL MEDICINE

## 2024-12-05 RX ORDER — WARFARIN 4 MG/1
4 TABLET ORAL SEE ADMIN INSTRUCTIONS
Qty: 90 TABLET | Refills: 1 | Status: SHIPPED | OUTPATIENT
Start: 2024-12-05 | End: 2025-06-03

## 2024-12-05 ASSESSMENT — ENCOUNTER SYMPTOMS
WEAKNESS: 1
DIFFICULTY URINATING: 1
FATIGUE: 1
DYSPHORIC MOOD: 1
DYSURIA: 0
HEMATURIA: 0
PALPITATIONS: 0
ARTHRALGIAS: 1
NUMBNESS: 1
ABDOMINAL DISTENTION: 1
RESPIRATORY NEGATIVE: 1

## 2024-12-05 NOTE — PROGRESS NOTES
Patient:  Jose Angel Riojas  YOB: 1945  MRN: [id number]       Chief Complaint/Active Symptoms:       Jose Angel Riojas is a 79 y.o. male who returns today for cardiac follow-up.    Here for routine follow-up. No chest pain or discomfort. No shortness of breath. No edema. Had some edema until he got a lift chair recently and now better. Is unaware of heart racing or skipping. No bleeding problems - no blood in urine or stool     Bothered most by neuropathic pain on left side. Has left hemiparesis since his stroke.       Review of Systems   Constitutional:  Positive for fatigue.   HENT:  Positive for congestion.    Respiratory: Negative.     Cardiovascular:  Positive for leg swelling. Negative for chest pain and palpitations.   Gastrointestinal:  Positive for abdominal distention.   Genitourinary:  Positive for difficulty urinating. Negative for dysuria and hematuria.   Musculoskeletal:  Positive for arthralgias.   Neurological:  Positive for weakness and numbness.   Psychiatric/Behavioral:  Positive for dysphoric mood.    All other systems reviewed and are negative.      Objective:     Vitals:    12/05/24 1434   BP: 102/68   Pulse: 55   Temp: 36.7 °C (98 °F)       Vitals:       Allergies:     Allergies   Allergen Reactions    Peanut Headache          Medications:     Current Outpatient Medications   Medication Instructions    amLODIPine (NORVASC) 5 mg, oral, Daily    aspirin 81 mg, Daily    atorvastatin (LIPITOR) 40 mg, oral, Daily    baclofen (Lioresal) 10 mg tablet 1.5 tablets, 3 times daily    cholecalciferol (VITAMIN D3) 5,000 Units, Daily    docusate sodium (COLACE) 100 mg, 2 times daily PRN    doxazosin (CARDURA) 1 mg, Nightly    DULoxetine (CYMBALTA) 30 mg, oral, Daily    levETIRAcetam (Keppra) 750 mg tablet 1 tablet, 2 times daily    lisinopril 40 mg, oral, Daily, as directed    metoprolol tartrate (LOPRESSOR) 50 mg, oral, 2 times daily    tamsulosin (FLOMAX) 0.4 mg, oral, Daily    traZODone  "(DESYREL) 50 mg, Daily    warfarin (Coumadin) 4 mg tablet Take by mouth. Take as directed per After Visit Summary.    warfarin (Coumadin) 5 mg tablet 1 tablet, Daily    warfarin (COUMADIN) 6 mg, oral, See admin instructions       Physical Examination:   GENERAL:  Well developed, well nourished, in no acute distress.  HEENT: NC AT, EOMI with anicteric sclera  NECK:   no JVD, no bruit.  CHEST:  Symmetric and nontender.  LUNGS:  Clear to auscultation bilaterally, normal respiratory effort.  HEART:  PMI nondisplaced, irregularly irregular with slow rate, normal S1 and S2, no S3. Mild ankle edema  ABDOMEN: Soft, NT, ND without palpable organomegaly or bruits  EXTREMITIES:  Warm with good color, no clubbing or cyanosis.  There is mild ankle edema noted.  PERIPHERAL VASCULAR:  Pulses present and equally palpable  MUSCULOSKELETAL: OA changes  NEURO/PSYCH:  Alert and oriented times three with approppriate behavior and responses. Left hemiparesis.     Lab:     CBC:   Lab Results   Component Value Date    WBC 4.6 06/03/2021    RBC 4.84 06/03/2021    HGB 15.0 06/03/2021    HCT 42.8 06/03/2021     06/03/2021        CMP:    Lab Results   Component Value Date     06/03/2021    K 4.1 06/03/2021     06/03/2021    CO2 29 06/03/2021    BUN 12 06/03/2021    CREATININE 0.88 06/03/2021    GLUCOSE 102 (H) 06/03/2021    CALCIUM 9.4 06/03/2021       Magnesium:    No results found for: \"MG\"    Lipid Profile:    Lab Results   Component Value Date    TRIG 82 02/23/2021    HDL 36.6 (A) 02/23/2021       TSH:    Lab Results   Component Value Date    TSH 1.25 06/03/2021       BNP:   No results found for: \"BNP\"     PT/INR:    Lab Results   Component Value Date    PROTIME 27.7 (H) 07/01/2021    INR 2.20 12/03/2024    INR 2.10 07/12/2024       HgBA1c:    Lab Results   Component Value Date    HGBA1C 6.1 06/03/2021       BMP:  Lab Results   Component Value Date     06/03/2021     02/23/2021     11/25/2020    K 4.1 " "06/03/2021    K 4.4 02/23/2021    K 4.0 11/25/2020     06/03/2021     02/23/2021     11/25/2020    CO2 29 06/03/2021    CO2 30 02/23/2021    CO2 28 11/25/2020    BUN 12 06/03/2021    BUN 13 02/23/2021    BUN 14 11/25/2020    CREATININE 0.88 06/03/2021    CREATININE 0.92 02/23/2021    CREATININE 0.87 11/25/2020       Cardiac Enzymes:    No results found for: \"TROPHS\"    Hepatic Function Panel:    Lab Results   Component Value Date    ALKPHOS 65 06/03/2021    ALT 17 06/03/2021    AST 18 06/03/2021    PROT 7.4 06/03/2021    BILITOT 1.3 (H) 06/03/2021         Diagnostic Studies:     No echocardiogram results found for the past 12 months    No nuclear medicine results found for the past 12 months    No valid procedures specified.    EKG:   EKG today - A fib with slow ventricular response, LAD,  PVC vs aberrant complex, NS T abn.     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 Will monitor and consider decreasing beta blockers at follow-up appointment.   HTN - BP on low side but asymptomatic. If remains low may decrease medications.   HLD - continue statin Rx.   Anticoagulation - no need for aspirin with warfarin therapy - just increases the patients risk of bleeding. Goal INR 2-3. Discussed change to eliquis or xarelto. Not an option due to cost for patient. Discussed home PT/INR monitoring given his disability and wheelchair status. If it does not cost them a lot of money they " would like to puruse this.     Will see him back in 6 months. Sooner if any issues.

## 2024-12-12 ENCOUNTER — APPOINTMENT (OUTPATIENT)
Dept: DERMATOLOGY | Facility: CLINIC | Age: 79
End: 2024-12-12
Payer: MEDICARE

## 2024-12-12 DIAGNOSIS — Z12.83 SKIN CANCER SCREENING: ICD-10-CM

## 2024-12-12 DIAGNOSIS — D22.9 NEVUS: Primary | ICD-10-CM

## 2024-12-12 DIAGNOSIS — L81.4 LENTIGO: ICD-10-CM

## 2024-12-12 DIAGNOSIS — Z12.83 SCREENING EXAM FOR SKIN CANCER: ICD-10-CM

## 2024-12-12 DIAGNOSIS — L82.1 SEBORRHEIC KERATOSIS: ICD-10-CM

## 2024-12-12 PROCEDURE — 1036F TOBACCO NON-USER: CPT | Performed by: NURSE PRACTITIONER

## 2024-12-12 PROCEDURE — 1159F MED LIST DOCD IN RCRD: CPT | Performed by: NURSE PRACTITIONER

## 2024-12-12 PROCEDURE — 99213 OFFICE O/P EST LOW 20 MIN: CPT | Performed by: NURSE PRACTITIONER

## 2024-12-12 PROCEDURE — 1123F ACP DISCUSS/DSCN MKR DOCD: CPT | Performed by: NURSE PRACTITIONER

## 2024-12-12 NOTE — PROGRESS NOTES
Subjective     Jose Angel Riojas is a 79 y.o. male who presents for the following: Skin Check.   Established patient in for full body skin exam.     Review of Systems:  No other skin or systemic complaints other than what is documented elsewhere in the note.    The following portions of the chart were reviewed this encounter and updated as appropriate:       Skin Cancer History  No skin cancer on file.    Specialty Problems          Dermatology Problems    Actinic keratosis    Angioma    Hemangioma of skin and subcutaneous tissue    Lentigines    Melanocytic nevi of left lower limb, including hip    Melanocytic nevi of left upper limb, including shoulder    Melanocytic nevi of other parts of face    Melanocytic nevi of right lower limb, including hip    Melanocytic nevi of trunk    Melanocytic nevi of unspecified part of face    Other melanin hyperpigmentation    Other seborrheic keratosis    Sebaceous cyst    Seborrheic dermatitis, unspecified    Seborrheic keratosis    Tinea pedis    Xerosis cutis    Dystrophic nail    Onychomycosis     Past Medical History:  Jose Angel Riojas  has a past medical history of Benign prostatic hyperplasia with lower urinary tract symptoms (12/21/2013), 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), Disorder of male genital organs, unspecified (06/24/2014), Disorder of the skin and subcutaneous tissue, unspecified, Encounter for immunization, Hemiplegia and hemiparesis following cerebral infarction affecting unspecified side (Multi) (12/15/2021), History of falling, Neuralgia and neuritis, unspecified (08/20/2019), Other chest pain, Other reduced mobility (02/04/2021), Other specified soft tissue disorders, Other symptoms and signs involving the musculoskeletal system (10/28/2020), Other vitreous opacities, unspecified eye (06/24/2014), Pain in leg, unspecified, Personal history of other (healed) physical injury and trauma, Personal history of other diseases of the musculoskeletal system and  connective tissue (02/19/2019), Personal history of other diseases of the musculoskeletal system and connective tissue (06/24/2014), Personal history of other diseases of the nervous system and sense organs (06/24/2014), Personal history of other specified conditions (12/10/2021), Personal history of other specified conditions, Personal history of other specified conditions, Personal history of other specified conditions (12/06/2018), Personal history of transient ischemic attack (TIA), and cerebral infarction without residual deficits, Personal history of transient ischemic attack (TIA), and cerebral infarction without residual deficits (06/06/2019), Personal history of urinary (tract) infections (12/10/2021), Sciatica, left side, Spastic hemiplegia affecting unspecified side (Multi) (04/19/2022), and Tinea unguium.    Past Surgical History:  Jose Angel Riojas  has a past surgical history that includes Vasectomy (06/25/2014); Other surgical history (12/14/2021); Other surgical history (12/14/2021); Other surgical history (11/23/2016); Other surgical history (08/18/2016); MR angio head wo IV contrast (6/29/2016); and MR angio neck wo IV contrast (6/29/2016).    Family History:  Patient family history includes Cerebral aneurysm in his father; Coronary artery disease in his brother; Diabetes in his mother; Heart attack in his brother; Valvular heart disease in his father.    Social History:  Jose Angel Riojas  reports that he has never smoked. He has never been exposed to tobacco smoke. He has never used smokeless tobacco. He reports that he does not drink alcohol and does not use drugs.    Allergies:  Peanut    Current Medications / CAM's:    Current Outpatient Medications:     amLODIPine (Norvasc) 5 mg tablet, Take 1 tablet (5 mg) by mouth once daily., Disp: 90 tablet, Rfl: 1    atorvastatin (Lipitor) 40 mg tablet, Take 1 tablet (40 mg) by mouth once daily., Disp: 90 tablet, Rfl: 1    baclofen (Lioresal) 10 mg tablet,  Take 1.5 tablets (15 mg) by mouth 3 times a day., Disp: , Rfl:     cholecalciferol (Vitamin D3) 50 mcg (2,000 unit) capsule, Take 5,000 Units by mouth early in the morning.., Disp: , Rfl:     docusate sodium (Colace) 100 mg capsule, Take 1 capsule (100 mg) by mouth 2 times a day as needed., Disp: , Rfl:     DULoxetine (Cymbalta) 30 mg DR capsule, Take 1 capsule (30 mg) by mouth once daily., Disp: 90 capsule, Rfl: 1    levETIRAcetam (Keppra) 750 mg tablet, Take 1 tablet (750 mg) by mouth 2 times a day., Disp: , Rfl:     lisinopril 40 mg tablet, Take 1 tablet (40 mg) by mouth once daily. as directed, Disp: 90 tablet, Rfl: 1    metoprolol tartrate (Lopressor) 50 mg tablet, Take 1 tablet by mouth 2 times a day., Disp: 180 tablet, Rfl: 1    tamsulosin (Flomax) 0.4 mg 24 hr capsule, Take 1 capsule (0.4 mg) by mouth once daily., Disp: 90 capsule, Rfl: 1    warfarin (Coumadin) 4 mg tablet, Take 1 tablet (4 mg) by mouth see administration instructions. Take as directed per After Visit Summary., Disp: 90 tablet, Rfl: 1    warfarin (Coumadin) 6 mg tablet, Take 1 tablet (6 mg) by mouth see administration instructions., Disp: 100 tablet, Rfl: 3     Objective   Well appearing patient in no apparent distress; mood and affect are within normal limits.      Assessment/Plan   1. Nevus  Uniform pigmented macule(s)/papule(s) with reassuring findings on dermoscopy    -Discussed nature of condition  -Reassurance, benign-appearing features on examination today  -Recommend continued observation    2. Skin cancer screening    Related Procedures  Follow Up In Dermatology - Established Patient    3. Lentigo  Tan macules    -Benign appearing on exam  -Reassurance, recommend observation    4. Seborrheic keratosis  Stuck on, waxy macule(s)/papule(s)/plaque(s) with comedo-like openings and milia like cysts    -Discussed nature of condition  -Reassurance, recommend continued observation    5. Screening exam for skin cancer

## 2024-12-27 ENCOUNTER — TELEPHONE (OUTPATIENT)
Dept: PRIMARY CARE | Facility: CLINIC | Age: 79
End: 2024-12-27
Payer: MEDICARE

## 2025-01-08 LAB
NON-UH HIE A/G RATIO: 1.2
NON-UH HIE ABSOLUTE BAND CT: 0 X1000 (ref 0–0.7)
NON-UH HIE ABSOLUTE LYMPH CT: 1.19 X1000 (ref 1.2–4.8)
NON-UH HIE ABSOLUTE MONO CT: 0.82 X1000 (ref 0.1–1)
NON-UH HIE ABSOLUTE NEUTROPHIL CT: 1.39 X1000 (ref 1.4–8.8)
NON-UH HIE ABSOLUTE SEG CT: 1.39 X1000 (ref 1.4–8.8)
NON-UH HIE ALB: 4 G/DL (ref 3.4–5)
NON-UH HIE ALK PHOS: 80 UNIT/L (ref 45–117)
NON-UH HIE ATYPICAL LYMPH: 11 % (ref 0–5)
NON-UH HIE BAND %: 0 %
NON-UH HIE BILIRUBIN, TOTAL: 1 MG/DL (ref 0.3–1.2)
NON-UH HIE BUN/CREAT RATIO: 13.6
NON-UH HIE BUN: 15 MG/DL (ref 9–23)
NON-UH HIE CALCIUM: 9.3 MG/DL (ref 8.7–10.4)
NON-UH HIE CALCULATED OSMOLALITY: 283 MOSM/KG (ref 275–295)
NON-UH HIE CHLORIDE: 106 MMOL/L (ref 98–107)
NON-UH HIE CO2, VENOUS: 28 MMOL/L (ref 20–31)
NON-UH HIE CREATININE: 1.1 MG/DL (ref 0.6–1.1)
NON-UH HIE DIFF?: YES
NON-UH HIE DXH ACTIONS: ABNORMAL
NON-UH HIE GFR AA: >60
NON-UH HIE GLOBULIN: 3.4 G/DL
NON-UH HIE GLOMERULAR FILTRATION RATE: >60 ML/MIN/1.73M?
NON-UH HIE GLUCOSE: 116 MG/DL (ref 74–106)
NON-UH HIE GOT: 14 UNIT/L (ref 15–37)
NON-UH HIE GPT: 17 UNIT/L (ref 10–49)
NON-UH HIE HCT: 42 % (ref 41–52)
NON-UH HIE HGB A1C: 6.4 %
NON-UH HIE HGB: 14.2 G/DL (ref 13.5–17.5)
NON-UH HIE INSTR WBC: 3.4
NON-UH HIE K: 3.6 MMOL/L (ref 3.5–5.1)
NON-UH HIE LARGE PLATELETS: PRESENT
NON-UH HIE LYMPHOCYTE %: 35 %
NON-UH HIE MCH: 30.9 PG (ref 27–34)
NON-UH HIE MCHC: 33.8 G/DL (ref 32–37)
NON-UH HIE MCV: 91.4 FL (ref 80–100)
NON-UH HIE MONOCYTE %: 24 %
NON-UH HIE MPV: 9.7 FL (ref 7.4–10.4)
NON-UH HIE NA: 141 MMOL/L (ref 135–145)
NON-UH HIE NUCLEATED RBC: 0 /100WBC
NON-UH HIE OVALOCYTES: ABNORMAL
NON-UH HIE PLATELET: 131 X10 (ref 150–450)
NON-UH HIE POIKILOCYTOSIS: ABNORMAL
NON-UH HIE RBC: 4.59 X10 (ref 4.7–6.1)
NON-UH HIE RDW: 14.1 % (ref 11.5–14.5)
NON-UH HIE SEGMENTED NEUT %: 41 %
NON-UH HIE SPECIAL NOTES: ABNORMAL
NON-UH HIE TOTAL PROTEIN: 7.4 G/DL (ref 5.7–8.2)
NON-UH HIE WBC: 3.4 X10 (ref 4.5–11)

## 2025-01-09 ENCOUNTER — TELEPHONE (OUTPATIENT)
Dept: PRIMARY CARE | Facility: CLINIC | Age: 80
End: 2025-01-09
Payer: MEDICARE

## 2025-01-09 ENCOUNTER — ANTICOAGULATION - WARFARIN VISIT (OUTPATIENT)
Dept: CARDIOLOGY | Facility: CLINIC | Age: 80
End: 2025-01-09
Payer: MEDICARE

## 2025-01-09 DIAGNOSIS — I48.91 ATRIAL FIBRILLATION, UNSPECIFIED TYPE (MULTI): ICD-10-CM

## 2025-01-09 NOTE — TELEPHONE ENCOUNTER
----- Message from Tim Castillo sent at 1/8/2025  4:24 PM EST -----  Acceptable results  ----- Message -----  From: Wally Delgadillo - Lab Results In  Sent: 1/8/2025   2:54 PM EST  To: Tim Castillo MD

## 2025-01-09 NOTE — TELEPHONE ENCOUNTER
Patient's wife is aware of his results.   ----- Message from Tim Castillo sent at 1/9/2025  8:45 AM EST -----  Low white cell count and low platelet as before.  I will discuss in detail in office visit.  Other results are acceptable  ----- Message -----  From: Wally Delgadillo - Lab Results In  Sent: 1/8/2025   2:54 PM EST  To: Tim Castillo MD

## 2025-01-14 ENCOUNTER — APPOINTMENT (OUTPATIENT)
Dept: PRIMARY CARE | Facility: CLINIC | Age: 80
End: 2025-01-14
Payer: MEDICARE

## 2025-01-14 VITALS
SYSTOLIC BLOOD PRESSURE: 118 MMHG | WEIGHT: 200 LBS | BODY MASS INDEX: 28.63 KG/M2 | HEIGHT: 70 IN | DIASTOLIC BLOOD PRESSURE: 76 MMHG | TEMPERATURE: 97.5 F | HEART RATE: 91 BPM

## 2025-01-14 DIAGNOSIS — I10 PRIMARY HYPERTENSION: Primary | ICD-10-CM

## 2025-01-14 DIAGNOSIS — G81.94 LEFT HEMIPARESIS (MULTI): ICD-10-CM

## 2025-01-14 DIAGNOSIS — Z12.11 COLON CANCER SCREENING: ICD-10-CM

## 2025-01-14 DIAGNOSIS — R73.9 HYPERGLYCEMIA: ICD-10-CM

## 2025-01-14 DIAGNOSIS — I48.19 PERSISTENT ATRIAL FIBRILLATION (MULTI): ICD-10-CM

## 2025-01-14 DIAGNOSIS — F32.A CHRONIC DEPRESSION: ICD-10-CM

## 2025-01-14 DIAGNOSIS — E78.00 PURE HYPERCHOLESTEROLEMIA: ICD-10-CM

## 2025-01-14 DIAGNOSIS — D69.6 THROMBOCYTOPENIA (CMS-HCC): ICD-10-CM

## 2025-01-14 DIAGNOSIS — E55.9 VITAMIN D DEFICIENCY: ICD-10-CM

## 2025-01-14 PROCEDURE — 99214 OFFICE O/P EST MOD 30 MIN: CPT | Performed by: INTERNAL MEDICINE

## 2025-01-14 PROCEDURE — 1159F MED LIST DOCD IN RCRD: CPT | Performed by: INTERNAL MEDICINE

## 2025-01-14 PROCEDURE — 3074F SYST BP LT 130 MM HG: CPT | Performed by: INTERNAL MEDICINE

## 2025-01-14 PROCEDURE — 1036F TOBACCO NON-USER: CPT | Performed by: INTERNAL MEDICINE

## 2025-01-14 PROCEDURE — G2211 COMPLEX E/M VISIT ADD ON: HCPCS | Performed by: INTERNAL MEDICINE

## 2025-01-14 PROCEDURE — 3078F DIAST BP <80 MM HG: CPT | Performed by: INTERNAL MEDICINE

## 2025-01-14 PROCEDURE — 1125F AMNT PAIN NOTED PAIN PRSNT: CPT | Performed by: INTERNAL MEDICINE

## 2025-01-14 PROCEDURE — 1123F ACP DISCUSS/DSCN MKR DOCD: CPT | Performed by: INTERNAL MEDICINE

## 2025-01-14 RX ORDER — DULOXETIN HYDROCHLORIDE 30 MG/1
30 CAPSULE, DELAYED RELEASE ORAL DAILY
Qty: 90 CAPSULE | Refills: 1 | Status: SHIPPED | OUTPATIENT
Start: 2025-01-14

## 2025-01-14 RX ORDER — AMLODIPINE BESYLATE 5 MG/1
5 TABLET ORAL DAILY
Qty: 90 TABLET | Refills: 1 | Status: SHIPPED | OUTPATIENT
Start: 2025-01-14 | End: 2026-01-14

## 2025-01-14 ASSESSMENT — PAIN SCALES - GENERAL: PAINLEVEL_OUTOF10: 8

## 2025-01-14 NOTE — PROGRESS NOTES
Internal Medicine Outpatient Visit  Chief Complaint   Patient presents with    Follow-up     4 month       HPI: Jose Angel Riojas is of 79 y.o. who is here for Internal Visit for the following issues:  Patient is seen in office today accompanied by his wife.  He is here for follow-up of his medical problems including hypertension, hyperlipidemia, hyperglycemia, thrombocytopenia, depression and other medical problems.  He wants to discuss the result of blood test which was done last week.  He also needs a refill on some of his prescriptions.  Today he denies any fever chill Anexsia.  Has no headache or visual disturbances.  Has no chest pain or palpitation.  Has no shortness of breath or wheezing.  He has no recent change in bowel habit.  He has not noticed any blood in urine or stool.  He is left-sided weakness is unchanged.  He denies any abnormal bleeding or bruising.  Review of other systems are negative.    Past Surgical History:   Procedure Laterality Date    MR HEAD ANGIO WO IV CONTRAST  6/29/2016    MR HEAD ANGIO WO IV CONTRAST 6/29/2016 [facility] CLINICAL LEGACY    MR NECK ANGIO WO IV CONTRAST  6/29/2016    MR NECK ANGIO WO IV CONTRAST 6/29/2016 [facility] CLINICAL LEGACY    OTHER SURGICAL HISTORY  12/14/2021    Oral surgery    OTHER SURGICAL HISTORY  12/14/2021    Root canal procedure    OTHER SURGICAL HISTORY  11/23/2016    Cranioplasty    OTHER SURGICAL HISTORY  08/18/2016    Craniotomy (Therapeutic)    VASECTOMY  06/25/2014    Surgery Vas Deferens Vasectomy       Family History   Problem Relation Name Age of Onset    Diabetes Mother      Cerebral aneurysm Father      Valvular heart disease Father      Coronary artery disease Brother      Heart attack Brother           Current Outpatient Medications on File Prior to Visit   Medication Sig Dispense Refill    atorvastatin (Lipitor) 40 mg tablet Take 1 tablet (40 mg) by mouth once daily. 90 tablet 1    baclofen (Lioresal) 10 mg tablet Take 1.5 tablets (15 mg) by mouth 3  "times a day.      cholecalciferol (Vitamin D3) 50 mcg (2,000 unit) capsule Take 5,000 Units by mouth early in the morning..      docusate sodium (Colace) 100 mg capsule Take 1 capsule (100 mg) by mouth 2 times a day as needed.      levETIRAcetam (Keppra) 750 mg tablet Take 1 tablet (750 mg) by mouth 2 times a day.      lisinopril 40 mg tablet Take 1 tablet (40 mg) by mouth once daily. as directed 90 tablet 1    metoprolol tartrate (Lopressor) 50 mg tablet Take 1 tablet by mouth 2 times a day. 180 tablet 1    tamsulosin (Flomax) 0.4 mg 24 hr capsule Take 1 capsule (0.4 mg) by mouth once daily. 90 capsule 1    warfarin (Coumadin) 4 mg tablet Take 1 tablet (4 mg) by mouth see administration instructions. Take as directed per After Visit Summary. 90 tablet 1    warfarin (Coumadin) 6 mg tablet Take 1 tablet (6 mg) by mouth see administration instructions. 100 tablet 3    [DISCONTINUED] amLODIPine (Norvasc) 5 mg tablet Take 1 tablet (5 mg) by mouth once daily. 90 tablet 1    [DISCONTINUED] DULoxetine (Cymbalta) 30 mg DR capsule Take 1 capsule (30 mg) by mouth once daily. 90 capsule 1     No current facility-administered medications on file prior to visit.       Blood pressure 118/76, pulse 91, temperature 36.4 °C (97.5 °F), height 1.778 m (5' 10\"), weight 90.7 kg (200 lb).  Body mass index is 28.7 kg/m².  General examination: There is no acute discomfort  Eyes: There is no pallor or jaundice pupils are equal and reactive to light  Neck: There is no JVD or carotid bruit  Lungs: Breath sounds are normal  CVS: Rhythm is irregularly irregular.  There is no appreciable murmur  Abdomen: Soft there is no tenderness  CNS: Left-sided weakness is unchanged  Musculoskeletal system there is no joint swelling  Legs: Trace of ankle edema is present    Assessment and plan:    1. Primary hypertension (Primary)  Controlled on the current medications.  Amlodipine is renewed  - amLODIPine (Norvasc) 5 mg tablet; Take 1 tablet (5 mg) by mouth " once daily.  Dispense: 90 tablet; Refill: 1    2. Hyperglycemia  Recent hemoglobin A1c level is 6.4, blood sugar is 116.  Advised to continue with the dietary restrictions    3. Pure hypercholesterolemia  Continued on atorvastatin.  Repeat lipid panel is ordered    4. Vitamin D deficiency  Continued on the current supplement    5. Persistent atrial fibrillation (Multi)  He is anticoagulated.  He is being followed by his cardiologist  - amLODIPine (Norvasc) 5 mg tablet; Take 1 tablet (5 mg) by mouth once daily.  Dispense: 90 tablet; Refill: 1    6. Health maintenance  Patient's PSA is being followed by his urologist.  He did not have any colonoscopy for a long time.  Alternative screening test discussed with the patient is interested in Cologuard, which is ordered.  He already had a flu vaccination.  I did recommend updated COVID-19 vaccine and RSV vaccine however he is not interested at this time.      7. Chronic depression  Stable.  Cymbalta is renewed  - DULoxetine (Cymbalta) 30 mg DR capsule; Take 1 capsule (30 mg) by mouth once daily.  Dispense: 90 capsule; Refill: 1    8. Thrombocytopenia (CMS-[facility])  Platelet count is 131.  He has a chronic thrombocytopenia.  I discussed hematology opinion however the patient wants it to be watched for the time being.  He has no abnormal bleeding or bruising  He will be seen in office in 3 to 4 months, earlier if needed

## 2025-01-23 ENCOUNTER — ANTICOAGULATION - WARFARIN VISIT (OUTPATIENT)
Dept: PRIMARY CARE | Facility: CLINIC | Age: 80
End: 2025-01-23
Payer: MEDICARE

## 2025-01-23 DIAGNOSIS — I48.20 CHRONIC ATRIAL FIBRILLATION (MULTI): ICD-10-CM

## 2025-01-30 ENCOUNTER — ANTICOAGULATION - WARFARIN VISIT (OUTPATIENT)
Dept: CARDIOLOGY | Facility: CLINIC | Age: 80
End: 2025-01-30
Payer: MEDICARE

## 2025-01-30 DIAGNOSIS — I48.91 ATRIAL FIBRILLATION, UNSPECIFIED TYPE (MULTI): ICD-10-CM

## 2025-02-05 ENCOUNTER — TELEPHONE (OUTPATIENT)
Dept: PRIMARY CARE | Facility: CLINIC | Age: 80
End: 2025-02-05
Payer: MEDICARE

## 2025-02-05 LAB — NONINV COLON CA DNA+OCC BLD SCRN STL QL: NEGATIVE

## 2025-02-13 ENCOUNTER — ANTICOAGULATION - WARFARIN VISIT (OUTPATIENT)
Dept: PRIMARY CARE | Facility: CLINIC | Age: 80
End: 2025-02-13
Payer: MEDICARE

## 2025-02-13 DIAGNOSIS — I48.20 CHRONIC ATRIAL FIBRILLATION (MULTI): ICD-10-CM

## 2025-02-20 ENCOUNTER — ANTICOAGULATION - WARFARIN VISIT (OUTPATIENT)
Dept: CARDIOLOGY | Facility: CLINIC | Age: 80
End: 2025-02-20
Payer: MEDICARE

## 2025-02-20 DIAGNOSIS — I48.20 CHRONIC ATRIAL FIBRILLATION (MULTI): ICD-10-CM

## 2025-02-20 LAB
INR IN PPP BY COAGULATION ASSAY EXTERNAL: 1.6 (ref 2–3)
PROTHROMBIN TIME (PT) IN PPP BY COAGULATION ASSAY EXTERNAL: ABNORMAL

## 2025-03-07 ENCOUNTER — ANTICOAGULATION - WARFARIN VISIT (OUTPATIENT)
Dept: PRIMARY CARE | Facility: CLINIC | Age: 80
End: 2025-03-07
Payer: MEDICARE

## 2025-03-07 DIAGNOSIS — I48.20 CHRONIC ATRIAL FIBRILLATION (MULTI): ICD-10-CM

## 2025-03-07 LAB
INR IN PPP BY COAGULATION ASSAY EXTERNAL: 1.8 (ref 2–3)
PROTHROMBIN TIME (PT) IN PPP BY COAGULATION ASSAY EXTERNAL: ABNORMAL

## 2025-03-12 ENCOUNTER — TELEPHONE (OUTPATIENT)
Dept: CARDIOLOGY | Facility: CLINIC | Age: 80
End: 2025-03-12
Payer: MEDICARE

## 2025-04-03 ENCOUNTER — TELEPHONE (OUTPATIENT)
Dept: PRIMARY CARE | Facility: CLINIC | Age: 80
End: 2025-04-03
Payer: MEDICARE

## 2025-04-03 NOTE — TELEPHONE ENCOUNTER
[facility] wants to know if you would follow the patient for home care therapy? He was discharged from nursing facility.

## 2025-04-07 ENCOUNTER — TELEPHONE (OUTPATIENT)
Dept: PRIMARY CARE | Facility: CLINIC | Age: 80
End: 2025-04-07
Payer: MEDICARE

## 2025-04-07 ENCOUNTER — PATIENT OUTREACH (OUTPATIENT)
Dept: PRIMARY CARE | Facility: CLINIC | Age: 80
End: 2025-04-07
Payer: MEDICARE

## 2025-04-07 NOTE — TELEPHONE ENCOUNTER
[facility] called for an update on his initial Evaluation. He will be seen twice a week for 3 weeks for physical therapy.

## 2025-04-07 NOTE — PROGRESS NOTES
Discharge Facility:  [facility]  Discharge Diagnosis: SNF  Admission Date:  2/18/25  Discharge Date:  4/5/25    PCP Appointment Date:  APR 10  2025 04:45 PM - Primary Care Hospital Discharge  [facility] - Tim Castillo MD     Specialist Appointment Date:   MAY 22  2025 11:15 AM - Cardiology Clinic Visit  [facility] - Reyna Kee MD     Hospital Encounter and Summary Linked: No  See discharge assessment below for further details       Wrap Up  Wrap Up Additional Comments: Spoke with patient and wife on speaker phone. Patient states his breathing is back to baseline and his gait is getting stronger. The area to his Left buttock has healed. Patient has all medications from SNF. This CM gives contact information for non urgent matters (4/7/2025  9:43 AM)    Engagement  Call Start Time: 0937 (4/7/2025  9:43 AM)    Medications  Medications reviewed with patient/caregiver?: No (4/7/2025  9:43 AM)  Is the patient having any side effects they believe may be caused by any medication additions or changes?: No (4/7/2025  9:43 AM)  Prescription Comments: patient's wife states he has all his medications from the SNF (4/7/2025  9:43 AM)  Medication Comments: n/a (4/7/2025  9:43 AM)    Appointments  Does the patient have a primary care provider?: Yes (4/7/2025  9:43 AM)  Care Management Interventions: Verified appointment date/time/provider (4/7/2025  9:43 AM)    Self Management  What is the home health agency?: [facility] was out today (4/7/2025  9:43 AM)  What Durable Medical Equipment (DME) was ordered?: n/a (4/7/2025  9:43 AM)    Patient Teaching  Does the patient have access to their discharge instructions?: Yes (4/7/2025  9:43 AM)  What is the patient's perception of their health status since discharge?: Improving (4/7/2025  9:43 AM)  Is the patient/caregiver able to teach back the hierarchy of who to call/visit for symptoms/problems? PCP, Specialist, Home Health nurse, Urgent Care, ED,  911: Yes (4/7/2025  9:43 AM)

## 2025-04-09 PROBLEM — I70.203 ATHEROSCLEROSIS OF ARTERY OF BOTH LOWER EXTREMITIES: Status: ACTIVE | Noted: 2025-04-09

## 2025-04-09 PROBLEM — R53.1 GENERALIZED WEAKNESS: Status: ACTIVE | Noted: 2025-02-16

## 2025-04-09 PROBLEM — J12.82 PNEUMONIA DUE TO COVID-19 VIRUS: Status: ACTIVE | Noted: 2025-02-16

## 2025-04-09 PROBLEM — U07.1 ACUTE COVID-19: Status: ACTIVE | Noted: 2025-04-09

## 2025-04-09 PROBLEM — R09.02 HYPOXIA: Status: ACTIVE | Noted: 2025-02-16

## 2025-04-09 PROBLEM — U07.1 PNEUMONIA DUE TO COVID-19 VIRUS: Status: ACTIVE | Noted: 2025-02-16

## 2025-04-10 ENCOUNTER — OFFICE VISIT (OUTPATIENT)
Dept: PRIMARY CARE | Facility: CLINIC | Age: 80
End: 2025-04-10
Payer: MEDICARE

## 2025-04-10 ENCOUNTER — TELEPHONE (OUTPATIENT)
Dept: PRIMARY CARE | Facility: CLINIC | Age: 80
End: 2025-04-10

## 2025-04-10 ENCOUNTER — OFFICE VISIT (OUTPATIENT)
Dept: CARDIOLOGY | Facility: CLINIC | Age: 80
End: 2025-04-10
Payer: MEDICARE

## 2025-04-10 VITALS
WEIGHT: 200 LBS | SYSTOLIC BLOOD PRESSURE: 110 MMHG | OXYGEN SATURATION: 98 % | HEART RATE: 58 BPM | BODY MASS INDEX: 28.63 KG/M2 | HEIGHT: 70 IN | DIASTOLIC BLOOD PRESSURE: 68 MMHG

## 2025-04-10 VITALS
WEIGHT: 200 LBS | BODY MASS INDEX: 28.63 KG/M2 | SYSTOLIC BLOOD PRESSURE: 110 MMHG | HEIGHT: 70 IN | DIASTOLIC BLOOD PRESSURE: 68 MMHG | HEART RATE: 58 BPM

## 2025-04-10 DIAGNOSIS — R26.2 UNABLE TO AMBULATE: ICD-10-CM

## 2025-04-10 DIAGNOSIS — U07.1 PNEUMONIA DUE TO COVID-19 VIRUS: ICD-10-CM

## 2025-04-10 DIAGNOSIS — G81.94 LEFT HEMIPARESIS (MULTI): ICD-10-CM

## 2025-04-10 DIAGNOSIS — J96.01 ACUTE HYPOXIC RESPIRATORY FAILURE: ICD-10-CM

## 2025-04-10 DIAGNOSIS — Z11.59 NEED FOR HEPATITIS C SCREENING TEST: ICD-10-CM

## 2025-04-10 DIAGNOSIS — R53.1 ASTHENIA: ICD-10-CM

## 2025-04-10 DIAGNOSIS — I10 PRIMARY HYPERTENSION: ICD-10-CM

## 2025-04-10 DIAGNOSIS — E78.00 PURE HYPERCHOLESTEROLEMIA: ICD-10-CM

## 2025-04-10 DIAGNOSIS — I10 ESSENTIAL HYPERTENSION: Primary | ICD-10-CM

## 2025-04-10 DIAGNOSIS — D64.9 ANEMIA, UNSPECIFIED TYPE: ICD-10-CM

## 2025-04-10 DIAGNOSIS — Z79.01 CHRONIC ANTICOAGULATION: ICD-10-CM

## 2025-04-10 DIAGNOSIS — Z09 NEED FOR FOLLOW-UP CARE AFTER DISCHARGE: Primary | ICD-10-CM

## 2025-04-10 DIAGNOSIS — J12.82 PNEUMONIA DUE TO COVID-19 VIRUS: ICD-10-CM

## 2025-04-10 DIAGNOSIS — I48.20 CHRONIC ATRIAL FIBRILLATION (MULTI): ICD-10-CM

## 2025-04-10 DIAGNOSIS — I48.91 ATRIAL FIBRILLATION, UNSPECIFIED TYPE (MULTI): ICD-10-CM

## 2025-04-10 PROCEDURE — 1159F MED LIST DOCD IN RCRD: CPT | Performed by: INTERNAL MEDICINE

## 2025-04-10 PROCEDURE — 1123F ACP DISCUSS/DSCN MKR DOCD: CPT | Performed by: INTERNAL MEDICINE

## 2025-04-10 PROCEDURE — 1160F RVW MEDS BY RX/DR IN RCRD: CPT | Performed by: INTERNAL MEDICINE

## 2025-04-10 PROCEDURE — G2211 COMPLEX E/M VISIT ADD ON: HCPCS | Performed by: INTERNAL MEDICINE

## 2025-04-10 PROCEDURE — 99213 OFFICE O/P EST LOW 20 MIN: CPT | Performed by: INTERNAL MEDICINE

## 2025-04-10 PROCEDURE — 1036F TOBACCO NON-USER: CPT | Performed by: INTERNAL MEDICINE

## 2025-04-10 PROCEDURE — 3074F SYST BP LT 130 MM HG: CPT | Performed by: INTERNAL MEDICINE

## 2025-04-10 PROCEDURE — 3078F DIAST BP <80 MM HG: CPT | Performed by: INTERNAL MEDICINE

## 2025-04-10 PROCEDURE — 99496 TRANSJ CARE MGMT HIGH F2F 7D: CPT | Performed by: INTERNAL MEDICINE

## 2025-04-10 RX ORDER — GABAPENTIN 100 MG/1
1 CAPSULE ORAL
COMMUNITY
Start: 2025-04-05

## 2025-04-10 ASSESSMENT — ENCOUNTER SYMPTOMS
ACTIVITY CHANGE: 1
CARDIOVASCULAR NEGATIVE: 1
DEPRESSION: 0
CONFUSION: 1
OCCASIONAL FEELINGS OF UNSTEADINESS: 0
STRIDOR: 0
DIFFICULTY URINATING: 1
WEAKNESS: 1
ABDOMINAL DISTENTION: 1
SHORTNESS OF BREATH: 1
DYSURIA: 0
LOSS OF SENSATION IN FEET: 0
HEMATURIA: 0
COUGH: 0
WHEEZING: 0

## 2025-04-10 ASSESSMENT — PATIENT HEALTH QUESTIONNAIRE - PHQ9
1. LITTLE INTEREST OR PLEASURE IN DOING THINGS: NOT AT ALL
SUM OF ALL RESPONSES TO PHQ9 QUESTIONS 1 & 2: 0
2. FEELING DOWN, DEPRESSED OR HOPELESS: NOT AT ALL

## 2025-04-10 NOTE — PROGRESS NOTES
Patient:  Jose Angel Riojas  YOB: 1945  MRN: [id number]       Chief Complaint/Active Symptoms:       Jose Angel Riojas is a 79 y.o. male who returns today for cardiac follow-up.    Patient is here little bit earlier for follow-up.  His wife is his caregiver and she is having surgery so she wanted to get his appointments taking care of she does not know how long she will be disabled and unable to bring him for appointments after her surgery patient was just discharged from rehab last week.  He was ill and had COVID pneumonia back in February and was hospitalized until recently at the rehab facility.  He recovered from the pneumonia quickly but he became so disabled that it took 4 weeks of physical therapy for him to recover before he can go home.    The wife had a lot of problems with his home PT/INR monitoring system.  They insisted she go to his nursing home and do his weekly PT/INRs while he was there.  They were also being monitored by his facility.  At this time with everything that is going on she would prefer if he moved over to oral anticoagulation with Xarelto or Eliquis rather than continue to have to deal with all the problems and issues related to ongoing warfarin therapy.  We reassured her since he does not have a mechanical valve replacement he is a reasonable candidate to switch from warfarin to Eliquis and there certainly benefits to doing so.    The patient has no chest pain or discomfort.  He has some mild shortness of breath on exertion without orthopnea or PND.  He said no blood in the urine or stool.      Review of Systems   Constitutional:  Positive for activity change.   Respiratory:  Positive for shortness of breath. Negative for cough, wheezing and stridor.    Cardiovascular: Negative.    Gastrointestinal:  Positive for abdominal distention.   Genitourinary:  Positive for difficulty urinating. Negative for dysuria and hematuria.   Musculoskeletal:  Positive for gait problem.    Neurological:  Positive for weakness.   Psychiatric/Behavioral:  Positive for confusion.    All other systems reviewed and are negative.      Objective:     Vitals:    04/10/25 1340   BP: 110/68   Pulse: 58       Vitals:    04/10/25 1340   Weight: 90.7 kg (200 lb)       Allergies:     Allergies   Allergen Reactions    Peanut Headache          Medications:     Current Outpatient Medications   Medication Instructions    amLODIPine (NORVASC) 5 mg, oral, Daily    atorvastatin (LIPITOR) 40 mg, oral, Daily    baclofen (Lioresal) 10 mg tablet 1.5 tablets, 3 times daily    cholecalciferol (VITAMIN D3) 5,000 Units, Daily    docusate sodium (COLACE) 100 mg, 2 times daily PRN    DULoxetine (CYMBALTA) 30 mg, oral, Daily    gabapentin (Neurontin) 100 mg capsule 1 capsule, 3 times daily (0900,1400,1900)    levETIRAcetam (Keppra) 750 mg tablet 1 tablet, 2 times daily    lisinopril 40 mg, oral, Daily, as directed    metoprolol tartrate (LOPRESSOR) 50 mg, oral, 2 times daily    tamsulosin (FLOMAX) 0.4 mg, oral, Daily    warfarin (COUMADIN) 6 mg, oral, See admin instructions    warfarin (COUMADIN) 4 mg, oral, See admin instructions, Take as directed per After Visit Summary.       Physical Examination:   GENERAL:  Well developed, well nourished, in no acute distress.  HEENT: NC AT, EOMI with anicteric sclera  NECK:   no JVD, no bruit.  CHEST:  Symmetric and nontender.  LUNGS:  Clear to auscultation bilaterally, normal respiratory effort.  Diminished at the bases  HEART: PMI not palpable.  There is a irregular rhythm with a normal rate, normal S1 and S2 no appreciable S3.  There is trace pedal edema with normal distal perfusion.  ABDOMEN: Soft, NT, ND without palpable organomegaly or bruits  EXTREMITIES:  Warm with good color, no clubbing or cyanosis.  There is trace edema noted.  PERIPHERAL VASCULAR:  Pulses present and equally palpable.  MUSCULOSKELETAL: Osteoarthritic changes, patient is currently in a wheelchair carrying a cane.   "Wife states is easier for longer distance at this time to use the wheelchair as he walks very slowly with his cane.  NEURO/PSYCH:  Alert and oriented times three with approppriate behavior and responses. Nonfocal motor examination with normal gait and ambulation  Skin: no rash or lesions on exposed skin or reported.    Lab:     CBC:   Lab Results   Component Value Date    WBC 2.6 (L) 03/18/2025    RBC 4.03 (L) 03/18/2025    HGB 12.3 (L) 03/18/2025    HCT 37.0 (L) 03/18/2025     (L) 03/18/2025        CMP:    Lab Results   Component Value Date     (L) 03/18/2025    K 3.5 03/18/2025     03/18/2025    CO2 24 03/18/2025    BUN 18 03/18/2025    CREATININE 0.81 03/18/2025    GLUCOSE 113 (H) 03/18/2025    CALCIUM 8.0 (L) 03/18/2025       Magnesium:    No results found for: \"MG\"    Lipid Profile:    Lab Results   Component Value Date    TRIG 82 02/23/2021    HDL 36.6 (A) 02/23/2021       TSH:    Lab Results   Component Value Date    TSH 1.25 06/03/2021       BNP:   No results found for: \"BNP\"     PT/INR:    Lab Results   Component Value Date    PROTIME 27.7 (H) 07/01/2021    INR 1.80 (A) 03/06/2025    INR 2.10 07/12/2024       HgBA1c:    Lab Results   Component Value Date    HGBA1C 6.1 06/03/2021       BMP:  Lab Results   Component Value Date     (L) 03/18/2025     06/03/2021     02/23/2021     11/25/2020    K 3.5 03/18/2025    K 4.1 06/03/2021    K 4.4 02/23/2021    K 4.0 11/25/2020     03/18/2025     06/03/2021     02/23/2021     11/25/2020    CO2 24 03/18/2025    CO2 29 06/03/2021    CO2 30 02/23/2021    CO2 28 11/25/2020    BUN 18 03/18/2025    BUN 12 06/03/2021    BUN 13 02/23/2021    BUN 14 11/25/2020    CREATININE 0.81 03/18/2025    CREATININE 0.88 06/03/2021    CREATININE 0.92 02/23/2021    CREATININE 0.87 11/25/2020       Cardiac Enzymes:    No results found for: \"TROPHS\"    Hepatic Function Panel:    Lab Results   Component Value Date    ALKPHOS 64 " 03/18/2025    ALT 20 03/18/2025    AST 15 03/18/2025    PROT 6.4 03/18/2025    BILITOT 1.0 03/18/2025     Labs reviewed    Diagnostic Studies:     Discharge summary from [facility] reviewed  Radiology:     No orders to display     ASSESSMENT     Problem List Items Addressed This Visit       Atrial fibrillation (Multi)    Relevant Medications    apixaban (Eliquis) 5 mg tablet    Other Relevant Orders    Referral to Clinical Pharmacy    Essential hypertension - Primary    Relevant Orders    Referral to Clinical Pharmacy    Chronic anticoagulation    Relevant Medications    apixaban (Eliquis) 5 mg tablet    Other Relevant Orders    Referral to Clinical Pharmacy     Other Visit Diagnoses       Primary hypertension                PLAN     1.  Atrial fibrillation and chronic anticoagulation.  I think is very reasonable and actually better if the patient moved from warfarin to one of the DOAC's.  Eliquis is appropriate with a lower risk of bleeding and safety above and beyond what can be provided with other oral anticoagulation.  His last dose of warfarin was last night.  I recommend that they hold it for 3 complete days to let him drift down with his INR and to start Eliquis 5 mg twice daily on Sunday.  I have asked our office to notify his home PT/INR monitoring that it will no longer be necessary.  And the wife's been warned that they may ask for any strips and the machine to be returned.  We have sent a prescription to his local pharmacy and we have given her several weeks samples of the medication.  Because of the cost involved we have given her referral to our clinical pharmacy to see if there are any programs that they can get it at a reduced price.  2.  Hypertension.  Blood pressures are controlled on his current medical regimen.    As long as he is doing well and has no problems we will see him in the office in follow-up in 6 months sooner if there is any questions or concerns.    The wife has  plans to place him in respiratory care at the skilled nursing facility he was at for rehab while she is hospitalized in her early home recovery from thyroid surgery.  Will be available if there is any questions.

## 2025-04-10 NOTE — PATIENT INSTRUCTIONS
Stop warfarin   Start eliquis 5 mg twice daily on Sunday (hold warfarin for 3 complete days prior to starting).

## 2025-04-10 NOTE — PROGRESS NOTES
Internal Medicine Outpatient Visit  Chief Complaint   Patient presents with    Hospital Follow-up     Hospital follow up: TCM 2/15-2/18/2025, then to [facility] on the 18th.        HPI: Jose Angel Riojas is of 79 y.o. who is here for Internal Visit for the following issues:  Patient is in office today for follow-up from recent discharge from nursing home.  Patient was in [facility] in February with acute hypoxic respiratory failure and COVID-related pneumonia.  He was also found to have sepsis and has generalized weakness.  Patient was unable to move around or ambulate.  He was sent to the nursing home for rehabilitation.  Patient was discharged from [facility] on the fifth of this month.  He has more energy now.  He has no fever or chill.  Denies any shortness of breath or wheezing.  Has no chest pain or palpitation.  He has chronic left-sided weakness.  He also has some swelling of the left leg which is also chronic.  He has some pain in the extremities in the nursing home and was started on gabapentin.  He does follow-up with his neurologist for left hemiparesis and history of seizures.  Review of other systems is negative.  Patient is accompanied by his wife in the office today.  He is in wheelchair.    Past Surgical History:   Procedure Laterality Date    MR HEAD ANGIO WO IV CONTRAST  6/29/2016    MR HEAD ANGIO WO IV CONTRAST 6/29/2016 [facility] CLINICAL LEGACY    MR NECK ANGIO WO IV CONTRAST  6/29/2016    MR NECK ANGIO WO IV CONTRAST 6/29/2016 [facility] CLINICAL LEGACY    OTHER SURGICAL HISTORY  12/14/2021    Oral surgery    OTHER SURGICAL HISTORY  12/14/2021    Root canal procedure    OTHER SURGICAL HISTORY  11/23/2016    Cranioplasty    OTHER SURGICAL HISTORY  08/18/2016    Craniotomy (Therapeutic)    VASECTOMY  06/25/2014    Surgery Vas Deferens Vasectomy       Family History   Problem Relation Name Age of Onset    Diabetes Mother      Cerebral aneurysm Father      Valvular  "heart disease Father      Coronary artery disease Brother      Heart attack Brother           Current Outpatient Medications on File Prior to Visit   Medication Sig Dispense Refill    amLODIPine (Norvasc) 5 mg tablet Take 1 tablet (5 mg) by mouth once daily. 90 tablet 1    atorvastatin (Lipitor) 40 mg tablet Take 1 tablet (40 mg) by mouth once daily. 90 tablet 1    baclofen (Lioresal) 10 mg tablet Take 1.5 tablets (15 mg) by mouth 3 times a day.      cholecalciferol (Vitamin D3) 50 mcg (2,000 unit) capsule Take 5,000 Units by mouth early in the morning..      docusate sodium (Colace) 100 mg capsule Take 1 capsule (100 mg) by mouth 2 times a day as needed.      DULoxetine (Cymbalta) 30 mg DR capsule Take 1 capsule (30 mg) by mouth once daily. 90 capsule 1    levETIRAcetam (Keppra) 750 mg tablet Take 1 tablet (750 mg) by mouth 2 times a day.      lisinopril 40 mg tablet Take 1 tablet (40 mg) by mouth once daily. as directed 90 tablet 1    metoprolol tartrate (Lopressor) 50 mg tablet Take 1 tablet by mouth 2 times a day. 180 tablet 1    tamsulosin (Flomax) 0.4 mg 24 hr capsule Take 1 capsule (0.4 mg) by mouth once daily. 90 capsule 1    [DISCONTINUED] warfarin (Coumadin) 4 mg tablet Take 1 tablet (4 mg) by mouth see administration instructions. Take as directed per After Visit Summary. 90 tablet 1    [DISCONTINUED] warfarin (Coumadin) 6 mg tablet Take 1 tablet (6 mg) by mouth see administration instructions. 100 tablet 3     No current facility-administered medications on file prior to visit.       Blood pressure 110/68, pulse 58, height 1.778 m (5' 10\"), weight 90.7 kg (200 lb), SpO2 98%.  Body mass index is 28.7 kg/m².  General examination: There is no acute discomfort  Eyes: There is no pallor or jaundice pupils are equal and reactive to light  Oral cavity throat normal  Neck: There is no carotid bruit or JVD  Lungs: Breath sounds are normal  CVS: Heart sounds are irregularly irregular.  There is no appreciable " murmur  Abdomen soft there is no tenderness  CNS: Left hemiparesis is present  Legs: Edema of the left leg is noted    Assessment and plan:    1. Need for follow-up care after discharge (Primary)  Nursing home records and discharge summary from [facility] is reviewed.  Medications are reconciled.  Tolerance and compliance is discussed with the patient.    2. Pneumonia due to COVID-19 virus  Resolved.  Patient has no respiratory symptoms now.    3. Acute hypoxic respiratory failure  Resolved.  Oxygen saturation 98% on room air in my office today.    4.  Peripheral neuropathy:  Patient was started on gabapentin in the nursing home.  The side effects are discussed with the patient in detail.  Advised that if symptoms are significantly better with the medication to continue with.  If it is not make any difference he can stop it gradually advised against stopping it suddenly.  Detailed instructions are given to the wife and patient.    5. Unable to ambulate  Patient is having physical therapy twice a week.    6. Chronic atrial fibrillation (Multi)  Patient is being followed by his cardiologist.  Today his anticoagulation was switched to Eliquis from warfarin.    7. Primary hypertension  Controlled on the current medications.    8. Left hemiparesis (Multi)  Continue with the physical therapy and supportive care.  Patient is advised to make a follow-up appointment with his neurologist.    9. Pure hypercholesterolemia  Continue with the atorvastatin.  Will have a repeat lipid panel before next office visit.  Patient will be seen in office in 3 months.

## 2025-04-11 ENCOUNTER — TELEPHONE (OUTPATIENT)
Dept: CARDIOLOGY | Facility: CLINIC | Age: 80
End: 2025-04-11
Payer: MEDICARE

## 2025-04-14 ENCOUNTER — ANTICOAGULATION - WARFARIN VISIT (OUTPATIENT)
Dept: PRIMARY CARE | Facility: CLINIC | Age: 80
End: 2025-04-14
Payer: MEDICARE

## 2025-04-14 ENCOUNTER — TELEPHONE (OUTPATIENT)
Dept: PRIMARY CARE | Facility: CLINIC | Age: 80
End: 2025-04-14
Payer: MEDICARE

## 2025-04-14 DIAGNOSIS — I48.91 ATRIAL FIBRILLATION, UNSPECIFIED TYPE (MULTI): ICD-10-CM

## 2025-04-15 ENCOUNTER — PATIENT OUTREACH (OUTPATIENT)
Dept: PRIMARY CARE | Facility: CLINIC | Age: 80
End: 2025-04-15
Payer: MEDICARE

## 2025-04-15 NOTE — PROGRESS NOTES
Call regarding appt. with PCP on 4/10/25 after hospitalization.  At time of outreach call the patient's wife feels as if their condition has improving since last visit. Patient has been weaning down from his Gabapentin.  Reviewed the PCP appointment with the pt and addressed any questions or concerns.

## 2025-04-25 ENCOUNTER — TELEPHONE (OUTPATIENT)
Dept: PRIMARY CARE | Facility: CLINIC | Age: 80
End: 2025-04-25
Payer: MEDICARE

## 2025-05-02 DIAGNOSIS — I10 PRIMARY HYPERTENSION: ICD-10-CM

## 2025-05-02 DIAGNOSIS — I48.19 PERSISTENT ATRIAL FIBRILLATION (MULTI): ICD-10-CM

## 2025-05-02 RX ORDER — AMLODIPINE BESYLATE 5 MG/1
5 TABLET ORAL DAILY
Qty: 90 TABLET | Refills: 1 | Status: SHIPPED | OUTPATIENT
Start: 2025-05-02 | End: 2026-05-02

## 2025-05-05 ENCOUNTER — TELEPHONE (OUTPATIENT)
Dept: PRIMARY CARE | Facility: CLINIC | Age: 80
End: 2025-05-05
Payer: MEDICARE

## 2025-05-05 NOTE — PROGRESS NOTES
"  Pharmacist Clinic: Cardiology Management    Jose Angel Riojas is a 79 y.o. male was referred to Clinical Pharmacy Team for anticoag  management.     Referring Provider: Reyna Kee MD    THIS IS A NEW PATIENT APPOINTMENT. PATIENT WILL BE ESTABLISHING CARE WITH CLINICAL PHARMACY.    Appointment was completed by jose angel and wife shruthi who was reached at [phone].    REVIEW OF PAST APPNT (IF APPLICABLE):   N/a    Allergies Reviewed? No    Allergies[1]    Medical History[2]    Medications Ordered Prior to Encounter[3]      RELEVANT LAB RESULTS:  Lab Results   Component Value Date    BILITOT 1.0 03/18/2025    CALCIUM 8.0 (L) 03/18/2025    CO2 24 03/18/2025     03/18/2025    CREATININE 0.81 03/18/2025    GLUCOSE 113 (H) 03/18/2025    ALKPHOS 64 03/18/2025    K 3.5 03/18/2025    PROT 6.4 03/18/2025     (L) 03/18/2025    AST 15 03/18/2025    ALT 20 03/18/2025    BUN 18 03/18/2025    ANIONGAP 13 03/18/2025    ALBUMIN 3.9 03/18/2025     Lab Results   Component Value Date    TRIG 82 02/23/2021    CHOL 127 02/23/2021    HDL 36.6 (A) 02/23/2021     No results found for: \"BMCBC\", \"CBCDIF\"     PHARMACEUTICAL ASSESSMENT:    MEDICATION RECONCILIATION    Was a medication reconciliation completed at this visit? Yes  Home Pharmacy Reviewed? Yes, describe: [facility]      Drug Interactions? No    Medication Documentation Review Audit       Reviewed by Cee Rodriguez MA (Medical Assistant) on 04/10/25 at 1448      Medication Order Taking? Sig Documenting Provider Last Dose Status   amLODIPine (Norvasc) 5 mg tablet [id number] Yes Take 1 tablet (5 mg) by mouth once daily. Tim Castillo MD  Active   apixaban (Eliquis) 5 mg tablet [id number] Yes Take 1 tablet (5 mg) by mouth 2 times a day. Reyna Kee MD  Active   atorvastatin (Lipitor) 40 mg tablet [id number] Yes Take 1 tablet (40 mg) by mouth once daily. Tim Castillo MD  Active   baclofen (Lioresal) 10 mg tablet [id number] Yes Take 1.5 tablets (15 " mg) by mouth 3 times a day. Historical Provider, MD Taking Active   cholecalciferol (Vitamin D3) 50 mcg (2,000 unit) capsule [id number] Yes Take 5,000 Units by mouth early in the morning.. Jordana Maharaj MD Taking Active   docusate sodium (Colace) 100 mg capsule [id number] Yes Take 1 capsule (100 mg) by mouth 2 times a day as needed. Jordana Maharaj MD Taking Active   DULoxetine (Cymbalta) 30 mg DR capsule [id number] Yes Take 1 capsule (30 mg) by mouth once daily. Tim Castillo MD  Active   gabapentin (Neurontin) 100 mg capsule [id number] Yes Take 1 capsule (100 mg) by mouth 3 times a day. Jordana Maharaj MD  Active   levETIRAcetam (Keppra) 750 mg tablet [id number] Yes Take 1 tablet (750 mg) by mouth 2 times a day. Jordana Maharaj MD Taking Active   lisinopril 40 mg tablet [id number] Yes Take 1 tablet (40 mg) by mouth once daily. as directed Tim Castillo MD  Active   metoprolol tartrate (Lopressor) 50 mg tablet [id number] Yes Take 1 tablet by mouth 2 times a day. Tim Castillo MD  Active   tamsulosin (Flomax) 0.4 mg 24 hr capsule [id number] Yes Take 1 capsule (0.4 mg) by mouth once daily. Tim Castillo MD  Active     Discontinued 04/10/25 1409   Discontinued 04/10/25 1409                    DISEASE MANAGEMENT ASSESSMENT:     ANTICOAGULATION ASSESSMENT    The ASCVD Risk score (Bakari DORAN, et al., 2019) failed to calculate for the following reasons:    Risk score cannot be calculated because patient has a medical history suggesting prior/existing ASCVD    DIAGNOSIS: prevention of nonvalvular atrial fibrilliation stroke and systemic embolism  - Patient is projected to be on anticoagulation indefinite  - [id number] Score: [5] (only included if diagnosis is atrial fibrillation)   Age: [<65 (0)] [65-74 (+1)] [> 75 (+2)]: 2  Sex: [Male/Female (+1)]: 0  CHF history: [No/Yes(+1)]: 0  Hypertension history: [No/Yes(+1)]: 1  Stroke/TIA/thromboembolism history: [No/Yes(+2)]: 2  Vascular disease  history (prior MI, peripheral artery disease, aortic plaque): [No/Yes(+1)]: 0  Diabetes history: [No/Yes(+1)]: 0        Affordability/Accessibility: $502 for first fill future fills 25% ~130/month  Adherence/Organization: no issues noted  Adverse Reactions: none reported  Recent Hospitalizations: no  Recent Falls/Trauma: no      EDUCATION/COUNSELING:   - Counseled patient on MOA, expectations, duration of therapy, contraindications, administration, and monitoring parameters  - Counseled patient of side effects that are indicative of bleeding such as dark tarry stool, unexplainable bruising, or vomiting up a coffee ground like substance          DISCUSSION/NOTES:    Patient Assistance Program (PAP)    Application for program to be submitted for the following medications: eliquis    Prescription Insurance:  Yes   Paid Test Claim:  Yes   County [place] Address[place]   Members of Household:  2   Files Taxes:  Yes     Patient will be email financial information to pharmacist directly at [email or web address].    Patient verbally reports monthly or yearly income which is less than 400% federal poverty level    Patient aware this process may take up to 6 weeks.     If approved medication must be filled through [facility] and MEDICATION WILL BE MAILED TO PATIENT.          ASSESSMENT:    Assessment/Plan   Problem List Items Addressed This Visit    None        RECOMMENDATIONS/PLAN:    Continue Eliquis 5mg bid. Needs a bridge supply so information provided for free trial while income info is submitted.    Last Appnt with Referring Provider: 4/10/2025  Next Appnt with Referring Provider: 10/23/25  Clinical Pharmacist follow up: 6/3 @10a  VAF/Application Expiration: pending  Type of Encounter: Vanessa Hayes PharmD    Verbal consent to manage patient's drug therapy was obtained from the patient . They were informed they may decline to participate or withdraw from participation in pharmacy  services at any time.    Continue all meds under the continuation of care with the referring provider and clinical pharmacy team.           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/15/2021    Hemiparesis affecting nondominant side as late effect of cerebrovascular accident    History of falling     History of fall    Neuralgia and neuritis, unspecified 08/20/2019    Neuropathic pain    Other chest pain     Chest wall pain    Other reduced mobility 02/04/2021    Decreased functional mobility and endurance    Other specified soft tissue disorders     Left leg swelling    Other symptoms and signs involving the musculoskeletal system 10/28/2020    Weakness of left upper extremity    Other vitreous opacities, unspecified eye 06/24/2014    Vitreous opacities    Pain in leg, unspecified     Leg pain    Personal history of other (healed) physical injury and trauma     History of chest wall injury    Personal history of other diseases of the musculoskeletal system and connective tissue 02/19/2019    History of neck pain    Personal history of other diseases of the musculoskeletal system and connective tissue 06/24/2014    History of low back pain    Personal history of other diseases of the nervous system and sense organs 06/24/2014    History of myopia    Personal history of other specified conditions 12/10/2021    History of weakness    Personal  history of other specified conditions     History of fatigue    Personal history of other specified conditions     History of unsteady gait    Personal history of other specified conditions 12/06/2018    History of syncope    Personal history of transient ischemic attack (TIA), and cerebral infarction without residual deficits     History of cerebrovascular accident    Personal history of transient ischemic attack (TIA), and cerebral infarction without residual deficits 06/06/2019    History of arterial ischemic stroke    Personal history of urinary (tract) infections 12/10/2021    History of urinary tract infection    Sciatica, left side     Sciatic pain, left    Spastic hemiplegia affecting unspecified side (Multi) 04/19/2022    Spastic hemiparesis    Tinea unguium     Onychomycosis of toenail   [3]   Current Outpatient Medications on File Prior to Visit   Medication Sig Dispense Refill    amLODIPine (Norvasc) 5 mg tablet Take 1 tablet (5 mg) by mouth once daily. 90 tablet 1    apixaban (Eliquis) 5 mg tablet Take 1 tablet (5 mg) by mouth 2 times a day. 180 tablet 1    atorvastatin (Lipitor) 40 mg tablet Take 1 tablet (40 mg) by mouth once daily. 90 tablet 1    baclofen (Lioresal) 10 mg tablet Take 1.5 tablets (15 mg) by mouth 3 times a day.      cholecalciferol (Vitamin D3) 50 mcg (2,000 unit) capsule Take 5,000 Units by mouth early in the morning..      docusate sodium (Colace) 100 mg capsule Take 1 capsule (100 mg) by mouth 2 times a day as needed.      DULoxetine (Cymbalta) 30 mg DR capsule Take 1 capsule (30 mg) by mouth once daily. 90 capsule 1    gabapentin (Neurontin) 100 mg capsule Take 1 capsule (100 mg) by mouth 3 times a day.      levETIRAcetam (Keppra) 750 mg tablet Take 1 tablet (750 mg) by mouth 2 times a day.      lisinopril 40 mg tablet Take 1 tablet (40 mg) by mouth once daily. as directed 90 tablet 1    metoprolol tartrate (Lopressor) 50 mg tablet Take 1 tablet by mouth 2 times a day. 180 tablet  1    tamsulosin (Flomax) 0.4 mg 24 hr capsule Take 1 capsule (0.4 mg) by mouth once daily. 90 capsule 1    [DISCONTINUED] amLODIPine (Norvasc) 5 mg tablet Take 1 tablet (5 mg) by mouth once daily. 90 tablet 1     No current facility-administered medications on file prior to visit.

## 2025-05-05 NOTE — TELEPHONE ENCOUNTER
Patient and his wife agree on the patients info regarding his med list and last office visit be sent to [facility]. Dr. Castillo has signed and is aware.

## 2025-05-06 ENCOUNTER — APPOINTMENT (OUTPATIENT)
Dept: PHARMACY | Facility: HOSPITAL | Age: 80
End: 2025-05-06
Payer: MEDICARE

## 2025-05-06 DIAGNOSIS — I48.91 ATRIAL FIBRILLATION, UNSPECIFIED TYPE (MULTI): ICD-10-CM

## 2025-05-06 DIAGNOSIS — I10 ESSENTIAL HYPERTENSION: ICD-10-CM

## 2025-05-06 DIAGNOSIS — Z79.01 CHRONIC ANTICOAGULATION: ICD-10-CM

## 2025-05-14 ENCOUNTER — PATIENT OUTREACH (OUTPATIENT)
Dept: PRIMARY CARE | Facility: CLINIC | Age: 80
End: 2025-05-14
Payer: MEDICARE

## 2025-05-15 ENCOUNTER — TELEPHONE (OUTPATIENT)
Dept: PRIMARY CARE | Facility: CLINIC | Age: 80
End: 2025-05-15
Payer: MEDICARE

## 2025-05-15 PROCEDURE — RXMED WILLOW AMBULATORY MEDICATION CHARGE

## 2025-05-15 NOTE — TELEPHONE ENCOUNTER
[facility] called and said they want to know if it's okay with you that the patient gets put on Tramadol, as the patient complains of pain?   He is there being taking care of while wife is having surgery.

## 2025-05-16 ENCOUNTER — PHARMACY VISIT (OUTPATIENT)
Dept: PHARMACY | Facility: CLINIC | Age: 80
End: 2025-05-16
Payer: COMMERCIAL

## 2025-05-22 ENCOUNTER — APPOINTMENT (OUTPATIENT)
Dept: CARDIOLOGY | Facility: CLINIC | Age: 80
End: 2025-05-22
Payer: MEDICARE

## 2025-05-22 ENCOUNTER — APPOINTMENT (OUTPATIENT)
Dept: PRIMARY CARE | Facility: CLINIC | Age: 80
End: 2025-05-22
Payer: MEDICARE

## 2025-05-27 ENCOUNTER — PATIENT OUTREACH (OUTPATIENT)
Dept: PRIMARY CARE | Facility: CLINIC | Age: 80
End: 2025-05-27
Payer: MEDICARE

## 2025-05-27 NOTE — PROGRESS NOTES
Discharge Facility:  [facility]     Received pt for TCM program . Further review of records reveled but was sent to facility for Respite care while spouse has surgery.  Will defer TCM program at this time.

## 2025-05-28 ENCOUNTER — OFFICE VISIT (OUTPATIENT)
Dept: PRIMARY CARE | Facility: CLINIC | Age: 80
End: 2025-05-28
Payer: MEDICARE

## 2025-05-28 ENCOUNTER — HOSPITAL ENCOUNTER (OUTPATIENT)
Dept: RADIOLOGY | Facility: CLINIC | Age: 80
Discharge: HOME | End: 2025-05-28
Payer: MEDICARE

## 2025-05-28 VITALS
HEIGHT: 70 IN | HEART RATE: 56 BPM | BODY MASS INDEX: 28.63 KG/M2 | OXYGEN SATURATION: 94 % | DIASTOLIC BLOOD PRESSURE: 70 MMHG | WEIGHT: 200 LBS | SYSTOLIC BLOOD PRESSURE: 120 MMHG

## 2025-05-28 DIAGNOSIS — R53.1 ASTHENIA: ICD-10-CM

## 2025-05-28 DIAGNOSIS — R06.82 TACHYPNEA: ICD-10-CM

## 2025-05-28 DIAGNOSIS — J18.9 PNEUMONIA OF LEFT LOWER LOBE DUE TO INFECTIOUS ORGANISM: Primary | ICD-10-CM

## 2025-05-28 DIAGNOSIS — R82.998 DARK URINE: ICD-10-CM

## 2025-05-28 DIAGNOSIS — I48.20 CHRONIC ATRIAL FIBRILLATION (MULTI): ICD-10-CM

## 2025-05-28 DIAGNOSIS — R06.82 TACHYPNEA: Primary | ICD-10-CM

## 2025-05-28 LAB
APPEARANCE UR: CLEAR
BILIRUB UR QL STRIP: NEGATIVE
COLOR UR: YELLOW
GLUCOSE UR QL STRIP: NEGATIVE
HGB UR QL STRIP: NEGATIVE
KETONES UR QL STRIP: NEGATIVE
LEUKOCYTE ESTERASE UR QL STRIP: NEGATIVE
NITRITE UR QL STRIP: NEGATIVE
PH UR STRIP: 6 [PH] (ref 5–8)
PROT UR QL STRIP: NEGATIVE
SP GR UR STRIP: 1.01 (ref 1–1.03)

## 2025-05-28 PROCEDURE — 71046 X-RAY EXAM CHEST 2 VIEWS: CPT | Performed by: RADIOLOGY

## 2025-05-28 PROCEDURE — 71046 X-RAY EXAM CHEST 2 VIEWS: CPT

## 2025-05-28 RX ORDER — DOXYCYCLINE 100 MG/1
100 CAPSULE ORAL 2 TIMES DAILY
Qty: 20 CAPSULE | Refills: 0 | Status: SHIPPED | OUTPATIENT
Start: 2025-05-28 | End: 2025-06-07

## 2025-05-28 ASSESSMENT — ENCOUNTER SYMPTOMS
LOSS OF SENSATION IN FEET: 0
OCCASIONAL FEELINGS OF UNSTEADINESS: 0
DEPRESSION: 0

## 2025-05-28 NOTE — PROGRESS NOTES
"Internal Medicine Outpatient Visit  Chief Complaint   Patient presents with    Health concerns      Health concerns of possible uti and breathing issues       HPI: Jose Angel Riojas is of 79 y.o. who is here for Internal Visit for the following issues:  Patient is seen in office today accompanied by his wife.  Patient recently came out of the nursing home where he was for respite care.  He was feeling weak so physical therapy came to evaluate the patient and they found that the patient is having shallow rapid breathing.  According to wife also some abnormal auscultatory finding was noted by the physical therapy team.  The physical therapy was on hold because they wanted the patient to cleared for physical therapy.  According to wife it was suspected that the patient may have some lung infection or UTI which is causing the patient to feel weak and shallow breathing.  He has no fever or chill.  Denies any cough or expectoration.  Has no chest pain or palpitation.  Has dark-colored urine according to wife.  Review of other systems are negative.    Surgical History[1]    Family History[2]      Medications Ordered Prior to Encounter[3]    Blood pressure 120/70, pulse 56, height 1.778 m (5' 10\"), weight 90.7 kg (200 lb), SpO2 94%.  Body mass index is 28.7 kg/m².  General examination: There is no acute discomfort  Eyes: There is no pallor or jaundice  Lungs: Breath sounds are diminished at bases  CVS: Heart sounds are irregularly irregular.  There is no gallop or murmur  Genitourinary system there is no CVA tenderness  Legs: Edema of the left leg is noted which is chronic    Assessment and plan:    1. Tachypnea (Primary)  Chest x-ray is being ordered for further evaluation    2. Dark urine  Urinalysis is ordered.    3. Asthenia  Clinical examination does not show any new weakness.    4. Chronic atrial fibrillation (Multi)  He is rate controlled and anticoagulated on apixaban.    I will contact him as soon as the result of " the test is available.  Advised to call our office if there is any new symptom.                         [1]   Past Surgical History:  Procedure Laterality Date    MR HEAD ANGIO WO IV CONTRAST  6/29/2016    MR HEAD ANGIO WO IV CONTRAST 6/29/2016 [facility] CLINICAL LEGACY    MR NECK ANGIO WO IV CONTRAST  6/29/2016    MR NECK ANGIO WO IV CONTRAST 6/29/2016 [facility] CLINICAL LEGACY    OTHER SURGICAL HISTORY  12/14/2021    Oral surgery    OTHER SURGICAL HISTORY  12/14/2021    Root canal procedure    OTHER SURGICAL HISTORY  11/23/2016    Cranioplasty    OTHER SURGICAL HISTORY  08/18/2016    Craniotomy (Therapeutic)    VASECTOMY  06/25/2014    Surgery Vas Deferens Vasectomy   [2]   Family History  Problem Relation Name Age of Onset    Diabetes Mother      Cerebral aneurysm Father      Valvular heart disease Father      Coronary artery disease Brother      Heart attack Brother     [3]   Current Outpatient Medications on File Prior to Visit   Medication Sig Dispense Refill    amLODIPine (Norvasc) 5 mg tablet Take 1 tablet (5 mg) by mouth once daily. 90 tablet 1    apixaban (Eliquis) 5 mg tablet Take 1 tablet (5 mg) by mouth 2 times a day. 180 tablet 1    apixaban (Eliquis) 5 mg tablet Take 1 tablet (5 mg) by mouth 2 times a day. 180 tablet 3    atorvastatin (Lipitor) 40 mg tablet Take 1 tablet (40 mg) by mouth once daily. 90 tablet 1    baclofen (Lioresal) 10 mg tablet Take 1.5 tablets (15 mg) by mouth 3 times a day.      cholecalciferol (Vitamin D3) 50 mcg (2,000 unit) capsule Take 5,000 Units by mouth early in the morning..      docusate sodium (Colace) 100 mg capsule Take 1 capsule (100 mg) by mouth 2 times a day as needed.      DULoxetine (Cymbalta) 30 mg DR capsule Take 1 capsule (30 mg) by mouth once daily. 90 capsule 1    gabapentin (Neurontin) 100 mg capsule Take 1 capsule (100 mg) by mouth 3 times a day.      levETIRAcetam (Keppra) 750 mg tablet Take 1 tablet (750 mg) by mouth 2 times a day.      lisinopril 40 mg tablet Take  1 tablet (40 mg) by mouth once daily. as directed 90 tablet 1    metoprolol tartrate (Lopressor) 50 mg tablet Take 1 tablet by mouth 2 times a day. 180 tablet 1    tamsulosin (Flomax) 0.4 mg 24 hr capsule Take 1 capsule (0.4 mg) by mouth once daily. 90 capsule 1     No current facility-administered medications on file prior to visit.

## 2025-05-29 ENCOUNTER — TELEPHONE (OUTPATIENT)
Dept: PRIMARY CARE | Facility: CLINIC | Age: 80
End: 2025-05-29
Payer: MEDICARE

## 2025-05-29 NOTE — TELEPHONE ENCOUNTER
[facility] home therapy called and said that the patient today had irregular vitals, his heart rate was between 46 and 52 with bp 126/98. And he's not walking at all now. Wants to know what should they do, so I can report back.

## 2025-05-29 NOTE — TELEPHONE ENCOUNTER
Patient's wife is aware of the results.   ----- Message from Tim Castillo sent at 5/29/2025  9:00 AM EDT -----  Urine test does not show any UTI  ----- Message -----  From: [facility] Results In  Sent: 5/28/2025  10:57 PM EDT  To: Tim Castillo MD

## 2025-06-03 ENCOUNTER — APPOINTMENT (OUTPATIENT)
Dept: PHARMACY | Facility: HOSPITAL | Age: 80
End: 2025-06-03
Payer: MEDICARE

## 2025-06-03 DIAGNOSIS — I48.91 ATRIAL FIBRILLATION, UNSPECIFIED TYPE (MULTI): ICD-10-CM

## 2025-06-03 DIAGNOSIS — I10 ESSENTIAL HYPERTENSION: ICD-10-CM

## 2025-06-03 DIAGNOSIS — Z79.01 CHRONIC ANTICOAGULATION: ICD-10-CM

## 2025-06-03 NOTE — PROGRESS NOTES
"  Pharmacist Clinic: Cardiology Management    Jose Angel Riojas is a 80 y.o. male was referred to Clinical Pharmacy Team for anticoag  management.     Referring Provider: Reyna Kee MD    THIS IS A NEW PATIENT APPOINTMENT. PATIENT WILL BE ESTABLISHING CARE WITH CLINICAL PHARMACY.    Appointment was completed by jose angel and wife shruthi who was reached at [phone].    REVIEW OF PAST APPNT (IF APPLICABLE):   No issues at last visit.  Started pap enrollment    Allergies Reviewed? No    Allergies[1]    Medical History[2]    Medications Ordered Prior to Encounter[3]      RELEVANT LAB RESULTS:  Lab Results   Component Value Date    BILITOT 1.0 03/18/2025    CALCIUM 8.0 (L) 03/18/2025    CO2 24 03/18/2025     03/18/2025    CREATININE 0.81 03/18/2025    GLUCOSE 113 (H) 03/18/2025    ALKPHOS 64 03/18/2025    K 3.5 03/18/2025    PROT 6.4 03/18/2025     (L) 03/18/2025    AST 15 03/18/2025    ALT 20 03/18/2025    BUN 18 03/18/2025    ANIONGAP 13 03/18/2025    ALBUMIN 3.9 03/18/2025     Lab Results   Component Value Date    TRIG 82 02/23/2021    CHOL 127 02/23/2021    HDL 36.6 (A) 02/23/2021     No results found for: \"BMCBC\", \"CBCDIF\"     PHARMACEUTICAL ASSESSMENT:    MEDICATION RECONCILIATION    Was a medication reconciliation completed at this visit? Yes  Home Pharmacy Reviewed? Yes, describe: [facility]      Drug Interactions? No    Medication Documentation Review Audit       Reviewed by Cee Rodriguez MA (Medical Assistant) on 05/28/25 at 1119      Medication Order Taking? Sig Documenting Provider Last Dose Status   amLODIPine (Norvasc) 5 mg tablet [id number] Yes Take 1 tablet (5 mg) by mouth once daily. Reyna Kee MD  Active   apixaban (Eliquis) 5 mg tablet [id number] Yes Take 1 tablet (5 mg) by mouth 2 times a day. Reyna Kee MD  Active   apixaban (Eliquis) 5 mg tablet [id number] Yes Take 1 tablet (5 mg) by mouth 2 times a day. Reyna Kee MD  Active   atorvastatin (Lipitor) " 40 mg tablet [id number] Yes Take 1 tablet (40 mg) by mouth once daily. Tim Castillo MD  Active   baclofen (Lioresal) 10 mg tablet [id number] Yes Take 1.5 tablets (15 mg) by mouth 3 times a day. Historical Provider, MD Taking Active   cholecalciferol (Vitamin D3) 50 mcg (2,000 unit) capsule [id number] Yes Take 5,000 Units by mouth early in the morning.. Historical Provider, MD Taking Active   docusate sodium (Colace) 100 mg capsule [id number] Yes Take 1 capsule (100 mg) by mouth 2 times a day as needed. Jordana Provider, MD Taking Active   DULoxetine (Cymbalta) 30 mg DR capsule [id number] Yes Take 1 capsule (30 mg) by mouth once daily. Tim Castillo MD  Active   gabapentin (Neurontin) 100 mg capsule [id number] Yes Take 1 capsule (100 mg) by mouth 3 times a day. Jordana Provider, MD  Active   levETIRAcetam (Keppra) 750 mg tablet [id number] Yes Take 1 tablet (750 mg) by mouth 2 times a day. Jordana Provider, MD Taking Active   lisinopril 40 mg tablet [id number] Yes Take 1 tablet (40 mg) by mouth once daily. as directed Tim Castillo MD  Active   metoprolol tartrate (Lopressor) 50 mg tablet [id number] Yes Take 1 tablet by mouth 2 times a day. Tim Castillo MD  Active   tamsulosin (Flomax) 0.4 mg 24 hr capsule [id number] Yes Take 1 capsule (0.4 mg) by mouth once daily. Tim Castillo MD  Active                    DISEASE MANAGEMENT ASSESSMENT:     ANTICOAGULATION ASSESSMENT    The ASCVD Risk score (Bakari DORAN, et al., 2019) failed to calculate for the following reasons:    Risk score cannot be calculated because patient has a medical history suggesting prior/existing ASCVD    DIAGNOSIS: prevention of nonvalvular atrial fibrilliation stroke and systemic embolism  - Patient is projected to be on anticoagulation indefinite  - [id number] Score: [5] (only included if diagnosis is atrial fibrillation)   Age: [<65 (0)] [65-74 (+1)] [> 75 (+2)]: 2  Sex: [Male/Female (+1)]: 0  CHF history: [No/Yes(+1)]:  0  Hypertension history: [No/Yes(+1)]: 1  Stroke/TIA/thromboembolism history: [No/Yes(+2)]: 2  Vascular disease history (prior MI, peripheral artery disease, aortic plaque): [No/Yes(+1)]: 0  Diabetes history: [No/Yes(+1)]: 0        Affordability/Accessibility: $502 for first fill future fills 25% ~130/month  Adherence/Organization: no issues noted  Adverse Reactions: none reported  Recent Hospitalizations: no  Recent Falls/Trauma: no      EDUCATION/COUNSELING:   - Counseled patient on MOA, expectations, duration of therapy, contraindications, administration, and monitoring parameters  - Counseled patient of side effects that are indicative of bleeding such as dark tarry stool, unexplainable bruising, or vomiting up a coffee ground like substance          DISCUSSION/NOTES:   -no new issues with eliquis  - discussed refill procedure for bolwell.        ASSESSMENT:    Assessment/Plan   Problem List Items Addressed This Visit    None        RECOMMENDATIONS/PLAN:    Continue Eliquis 5mg bid.     Last Appnt with Referring Provider: 4/10/2025  Next Appnt with Referring Provider: 10/23/25  Clinical Pharmacist follow up: 4/14 @10a for pap renewal  VAF/Application Expiration: 5/15/2026  Type of Encounter: Vanessa Hayes PharmD    Verbal consent to manage patient's drug therapy was obtained from the patient . They were informed they may decline to participate or withdraw from participation in pharmacy services at any time.    Continue all meds under the continuation of care with the referring provider and clinical pharmacy team.           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 (Multi) 12/15/2021    Hemiparesis affecting nondominant side as late effect of cerebrovascular accident    History of falling     History of fall    Neuralgia and neuritis, unspecified 08/20/2019    Neuropathic pain    Other chest pain     Chest wall pain    Other reduced mobility 02/04/2021    Decreased functional mobility and endurance    Other specified soft tissue disorders     Left leg swelling    Other symptoms and signs involving the musculoskeletal system 10/28/2020    Weakness of left upper extremity    Other vitreous opacities, unspecified eye 06/24/2014    Vitreous opacities    Pain in leg, unspecified     Leg pain    Personal history of other (healed) physical injury and trauma     History of chest wall injury    Personal history of other diseases of the musculoskeletal system and connective tissue 02/19/2019    History of neck pain    Personal history of other diseases of the musculoskeletal system and connective tissue 06/24/2014    History of low back pain    Personal history of other diseases of the nervous system and sense organs 06/24/2014    History of myopia    Personal history of other specified conditions 12/10/2021    History of weakness    Personal history of other specified conditions     History of fatigue    Personal history of other specified conditions     History of unsteady gait    Personal history of other specified conditions 12/06/2018    History of syncope    Personal history of transient ischemic attack (TIA), and cerebral infarction without residual deficits     History of cerebrovascular accident    Personal history of transient ischemic attack (TIA), and cerebral infarction without residual deficits 06/06/2019    History of arterial ischemic stroke     Personal history of urinary (tract) infections 12/10/2021    History of urinary tract infection    Sciatica, left side     Sciatic pain, left    Spastic hemiplegia affecting unspecified side (Multi) 04/19/2022    Spastic hemiparesis    Tinea unguium     Onychomycosis of toenail   [3]   Current Outpatient Medications on File Prior to Visit   Medication Sig Dispense Refill    amLODIPine (Norvasc) 5 mg tablet Take 1 tablet (5 mg) by mouth once daily. 90 tablet 1    apixaban (Eliquis) 5 mg tablet Take 1 tablet (5 mg) by mouth 2 times a day. 180 tablet 1    apixaban (Eliquis) 5 mg tablet Take 1 tablet (5 mg) by mouth 2 times a day. 180 tablet 3    atorvastatin (Lipitor) 40 mg tablet Take 1 tablet (40 mg) by mouth once daily. 90 tablet 1    baclofen (Lioresal) 10 mg tablet Take 1.5 tablets (15 mg) by mouth 3 times a day.      cholecalciferol (Vitamin D3) 50 mcg (2,000 unit) capsule Take 5,000 Units by mouth early in the morning..      docusate sodium (Colace) 100 mg capsule Take 1 capsule (100 mg) by mouth 2 times a day as needed.      doxycycline (Vibramycin) 100 mg capsule Take 1 capsule (100 mg) by mouth 2 times a day for 10 days. Take with at least 8 ounces (large glass) of water, do not lie down for 30 minutes after 20 capsule 0    DULoxetine (Cymbalta) 30 mg DR capsule Take 1 capsule (30 mg) by mouth once daily. 90 capsule 1    gabapentin (Neurontin) 100 mg capsule Take 1 capsule (100 mg) by mouth 3 times a day.      levETIRAcetam (Keppra) 750 mg tablet Take 1 tablet (750 mg) by mouth 2 times a day.      lisinopril 40 mg tablet Take 1 tablet (40 mg) by mouth once daily. as directed 90 tablet 1    metoprolol tartrate (Lopressor) 50 mg tablet Take 1 tablet by mouth 2 times a day. 180 tablet 1    tamsulosin (Flomax) 0.4 mg 24 hr capsule Take 1 capsule (0.4 mg) by mouth once daily. 90 capsule 1     No current facility-administered medications on file prior to visit.

## 2025-06-20 ENCOUNTER — PATIENT OUTREACH (OUTPATIENT)
Dept: PRIMARY CARE | Facility: CLINIC | Age: 80
End: 2025-06-20
Payer: MEDICARE

## 2025-06-20 ENCOUNTER — TELEPHONE (OUTPATIENT)
Dept: PRIMARY CARE | Facility: CLINIC | Age: 80
End: 2025-06-20
Payer: MEDICARE

## 2025-06-20 NOTE — PROGRESS NOTES
I spoke with Inés/wife this morning for a TCM outreach call to review [facility] hospital & SNF stay. I have scheduled a [facility] hospital follow up appt for 7/1/25. Patient already had appt set for 7/15/25 for annual wellness visit.     Wife was planning to have routine labs done prior to the 7/15/25 appt and is now wondering if she should have them all done before 7/1/25 hospital follow up appt?     Provider is able to do TCM & AWV together if he feels appropriate for this patient.    Wife would like me to ask Dr Castillo what he prefers. Please have someone from office reach out  to Inés to advise about when to have labs done(orders already in chart) and if they should also keep or cancel 7/15/25 appt.     Thank you,  Lakeisha CURRAN

## 2025-06-20 NOTE — PROGRESS NOTES
Discharge Facility:  [facility] 5/29/25-6/2/25 transferred to [facility] 6/2/25-6/19/25    Discharge Diagnosis:  SNF discharge  Unable to ambulate   Generalized weakness   Acute cough    Admission Date:  5/29/25  Discharge Date:   6/19/25    PCP Appointment Date:  Appointment with Liat Castillo MD (07/01/2025)   Appointment with Liat Castillo MD (07/15/2025)   Specialist Appointment Date:   Neuro - June 30  Appointment with Reyna Kee MD (10/23/2025)   Hospital Encounter and Summary Linked: Yes-limited   May 29  Unknown with LAIT CASTILLO; NABILA AVILEZ; KYLIE BABIN; CORBIN OMER; CHAPINCITO PEÑA; COLTON ROLLE   See discharge assessment below for further details  Wrap Up  Wrap Up Additional Comments: Successful transition of care outreach within 2 business days of discharge. CM introduced myself and the TCM program. Spoke with wife/caregiver. Jose Angel was working with [facility] home care therapist when I called .   CM gave my contact information and encouraged to call if needing assistance or has any further non-emergent questions prior to my next outreach.   Reviewed hospital stay and answered any questions. Caregiver denies any further discharge questions/needs at this time. (6/20/2025 10:17 AM)    Engagement  Call Start Time: -- (Spoke with wife, Jose Angel is working with home therapy at time of call) (6/20/2025 10:17 AM)    Appointments  Does the patient have a primary care provider?: Yes (6/20/2025 10:17 AM)  Care Management Interventions: Verified appointment date/time/provider (Keep AWV appt at this time for the 15th but I scheduled hosptial follow up for July 1st . WIll message PCP to ask if should have labs done sooner.) (6/20/2025 10:17 AM)  Has the patient kept scheduled appointments due by today?: Yes (6/20/2025 10:17 AM)    Self Management  What is the home health agency?: [facility] (6/20/2025 10:17 AM)  Has home health visited  the patient within 72 hours of discharge?: Yes (6/20/2025 10:17 AM)  What Durable Medical Equipment (DME) was ordered?: na (6/20/2025 10:17 AM)    Patient Teaching  Does the patient have access to their discharge instructions?: Yes (6/20/2025 10:17 AM)  Care Management Interventions: Reviewed instructions with patient (6/20/2025 10:17 AM)  What is the patient's perception of their health status since discharge?: Improving (6/20/2025 10:17 AM)  Is the patient/caregiver able to teach back the hierarchy of who to call/visit for symptoms/problems? PCP, Specialist, Home Health nurse, Urgent Care, ED, 911: Yes (6/20/2025 10:17 AM)

## 2025-06-27 PROBLEM — R55 SYNCOPE AND COLLAPSE: Status: ACTIVE | Noted: 2019-03-11

## 2025-06-27 PROBLEM — R05.1 ACUTE COUGH: Status: ACTIVE | Noted: 2025-05-29

## 2025-06-28 LAB
25(OH)D3+25(OH)D2 SERPL-MCNC: 84 NG/ML (ref 30–100)
ALBUMIN SERPL-MCNC: 4.3 G/DL (ref 3.6–5.1)
ALP SERPL-CCNC: 58 U/L (ref 35–144)
ALT SERPL-CCNC: 10 U/L (ref 9–46)
ANION GAP SERPL CALCULATED.4IONS-SCNC: 9 MMOL/L (CALC) (ref 7–17)
AST SERPL-CCNC: 12 U/L (ref 10–35)
BASOPHILS # BLD AUTO: 10 CELLS/UL (ref 0–200)
BASOPHILS NFR BLD AUTO: 0.3 %
BILIRUB SERPL-MCNC: 1 MG/DL (ref 0.2–1.2)
BUN SERPL-MCNC: 12 MG/DL (ref 7–25)
CALCIUM SERPL-MCNC: 9 MG/DL (ref 8.6–10.3)
CHLORIDE SERPL-SCNC: 105 MMOL/L (ref 98–110)
CHOLEST SERPL-MCNC: 116 MG/DL
CHOLEST/HDLC SERPL: 2.9 (CALC)
CO2 SERPL-SCNC: 28 MMOL/L (ref 20–32)
CREAT SERPL-MCNC: 0.96 MG/DL (ref 0.7–1.28)
EGFRCR SERPLBLD CKD-EPI 2021: 80 ML/MIN/1.73M2
EOSINOPHIL # BLD AUTO: 0 CELLS/UL (ref 15–500)
EOSINOPHIL NFR BLD AUTO: 0 %
ERYTHROCYTE [DISTWIDTH] IN BLOOD BY AUTOMATED COUNT: 12.8 % (ref 11–15)
EST. AVERAGE GLUCOSE BLD GHB EST-MCNC: 134 MG/DL
EST. AVERAGE GLUCOSE BLD GHB EST-SCNC: 7.4 MMOL/L
GLUCOSE SERPL-MCNC: 93 MG/DL (ref 65–99)
HBA1C MFR BLD: 6.3 %
HBV SURFACE AB SERPL IA-ACNC: 24 MIU/ML
HCT VFR BLD AUTO: 41.4 % (ref 38.5–50)
HCV AB SERPL QL IA: NORMAL
HDLC SERPL-MCNC: 40 MG/DL
HGB BLD-MCNC: 13.8 G/DL (ref 13.2–17.1)
LDLC SERPL CALC-MCNC: 63 MG/DL (CALC)
LYMPHOCYTES # BLD AUTO: 1370 CELLS/UL (ref 850–3900)
LYMPHOCYTES NFR BLD AUTO: 40.3 %
MCH RBC QN AUTO: 31.8 PG (ref 27–33)
MCHC RBC AUTO-ENTMCNC: 33.3 G/DL (ref 32–36)
MCV RBC AUTO: 95.4 FL (ref 80–100)
MONOCYTES # BLD AUTO: 870 CELLS/UL (ref 200–950)
MONOCYTES NFR BLD AUTO: 25.6 %
NEUTROPHILS # BLD AUTO: 1149 CELLS/UL (ref 1500–7800)
NEUTROPHILS NFR BLD AUTO: 33.8 %
NONHDLC SERPL-MCNC: 76 MG/DL (CALC)
PLATELET # BLD AUTO: 122 THOUSAND/UL (ref 140–400)
PMV BLD REES-ECKER: 11.7 FL (ref 7.5–12.5)
POTASSIUM SERPL-SCNC: 3.9 MMOL/L (ref 3.5–5.3)
PROT SERPL-MCNC: 7.3 G/DL (ref 6.1–8.1)
RBC # BLD AUTO: 4.34 MILLION/UL (ref 4.2–5.8)
SERVICE CMNT-IMP: ABNORMAL
SODIUM SERPL-SCNC: 142 MMOL/L (ref 135–146)
TRIGL SERPL-MCNC: 58 MG/DL
TSH SERPL-ACNC: 0.48 MIU/L (ref 0.4–4.5)
VIT B12 SERPL-MCNC: 398 PG/ML (ref 200–1100)
WBC # BLD AUTO: 3.4 THOUSAND/UL (ref 3.8–10.8)

## 2025-06-30 DIAGNOSIS — D69.6 THROMBOCYTOPENIA: Primary | ICD-10-CM

## 2025-06-30 NOTE — TELEPHONE ENCOUNTER
Patient's wife is aware of all results, and will be in tomorrow for appt.   ----- Message from Tim Castillo sent at 6/30/2025 11:50 AM EDT -----  1.  Platelet count and white cell count is slightly low again but it is better than the last time.  If he wants we can refer him to a blood specialist for opinion or we can just repeat CBC in a   couple of months.  Order is in the chart.  2.  Other results are okay.  ----- Message -----  From: [facility] Results In  Sent: 6/27/2025   9:50 PM EDT  To: Tim Castillo MD

## 2025-07-01 ENCOUNTER — APPOINTMENT (OUTPATIENT)
Dept: PRIMARY CARE | Facility: CLINIC | Age: 80
End: 2025-07-01
Payer: MEDICARE

## 2025-07-01 VITALS
HEART RATE: 52 BPM | WEIGHT: 208 LBS | OXYGEN SATURATION: 95 % | DIASTOLIC BLOOD PRESSURE: 70 MMHG | BODY MASS INDEX: 29.78 KG/M2 | HEIGHT: 70 IN | SYSTOLIC BLOOD PRESSURE: 120 MMHG

## 2025-07-01 DIAGNOSIS — R53.1 ASTHENIA: ICD-10-CM

## 2025-07-01 DIAGNOSIS — Z09 HOSPITAL DISCHARGE FOLLOW-UP: ICD-10-CM

## 2025-07-01 DIAGNOSIS — R56.9 SEIZURE (MULTI): ICD-10-CM

## 2025-07-01 DIAGNOSIS — F32.A CHRONIC DEPRESSION: ICD-10-CM

## 2025-07-01 DIAGNOSIS — N40.0 BENIGN PROSTATIC HYPERPLASIA WITHOUT LOWER URINARY TRACT SYMPTOMS: ICD-10-CM

## 2025-07-01 DIAGNOSIS — I10 PRIMARY HYPERTENSION: ICD-10-CM

## 2025-07-01 DIAGNOSIS — R26.2 AMBULATORY DYSFUNCTION: Primary | ICD-10-CM

## 2025-07-01 DIAGNOSIS — G81.94 LEFT HEMIPARESIS (MULTI): ICD-10-CM

## 2025-07-01 DIAGNOSIS — E78.00 PURE HYPERCHOLESTEROLEMIA: ICD-10-CM

## 2025-07-01 DIAGNOSIS — I48.91 ATRIAL FIBRILLATION, UNSPECIFIED TYPE (MULTI): ICD-10-CM

## 2025-07-01 PROCEDURE — 3074F SYST BP LT 130 MM HG: CPT | Performed by: INTERNAL MEDICINE

## 2025-07-01 PROCEDURE — 99495 TRANSJ CARE MGMT MOD F2F 14D: CPT | Performed by: INTERNAL MEDICINE

## 2025-07-01 PROCEDURE — 1159F MED LIST DOCD IN RCRD: CPT | Performed by: INTERNAL MEDICINE

## 2025-07-01 PROCEDURE — 1036F TOBACCO NON-USER: CPT | Performed by: INTERNAL MEDICINE

## 2025-07-01 PROCEDURE — 3078F DIAST BP <80 MM HG: CPT | Performed by: INTERNAL MEDICINE

## 2025-07-01 RX ORDER — TAMSULOSIN HYDROCHLORIDE 0.4 MG/1
0.4 CAPSULE ORAL DAILY
Qty: 90 CAPSULE | Refills: 1 | Status: SHIPPED | OUTPATIENT
Start: 2025-07-01

## 2025-07-01 RX ORDER — LISINOPRIL 40 MG/1
40 TABLET ORAL DAILY
Qty: 90 TABLET | Refills: 1 | Status: SHIPPED | OUTPATIENT
Start: 2025-07-01

## 2025-07-01 RX ORDER — DULOXETIN HYDROCHLORIDE 30 MG/1
30 CAPSULE, DELAYED RELEASE ORAL DAILY
Qty: 90 CAPSULE | Refills: 1 | Status: SHIPPED | OUTPATIENT
Start: 2025-07-01

## 2025-07-01 RX ORDER — ATORVASTATIN CALCIUM 40 MG/1
40 TABLET, FILM COATED ORAL DAILY
Qty: 90 TABLET | Refills: 1 | Status: SHIPPED | OUTPATIENT
Start: 2025-07-01

## 2025-07-01 RX ORDER — METOPROLOL TARTRATE 50 MG/1
50 TABLET ORAL 2 TIMES DAILY
Qty: 180 TABLET | Refills: 1 | Status: SHIPPED | OUTPATIENT
Start: 2025-07-01

## 2025-07-01 ASSESSMENT — ENCOUNTER SYMPTOMS
LOSS OF SENSATION IN FEET: 0
DEPRESSION: 0
OCCASIONAL FEELINGS OF UNSTEADINESS: 0

## 2025-07-01 ASSESSMENT — PATIENT HEALTH QUESTIONNAIRE - PHQ9
2. FEELING DOWN, DEPRESSED OR HOPELESS: NOT AT ALL
SUM OF ALL RESPONSES TO PHQ9 QUESTIONS 1 & 2: 0
1. LITTLE INTEREST OR PLEASURE IN DOING THINGS: NOT AT ALL

## 2025-07-01 NOTE — PROGRESS NOTES
"Internal Medicine Outpatient Visit  Chief Complaint   Patient presents with    Hospital Follow-up     Hospital follow up: TCM atr [facility] 5/30/2025-6/2/2025. Then to nursing home right after.        HPI: Jose Angel Riojas is of 80 y.o. who is here for Internal Visit for the following issues:  Patient is in my office today for follow-up from recent discharge from [facility] and subsequently from the nursing home.  Patient has gone to [facility] on 30 May with chief complaint of inability to walk and generalized weakness.  He also has some pain in the limbs.  He was evaluated in the hospital by the neurologist Dr. Espinoza.  His cardiologist Dr. OMER and pain management specialist Dr. Hernández.  No specific diagnosis could be made but patient was feeling generally weak so he was discharged to nursing home with the physical therapy is gradually better.  He thinks that he is very close to his baseline now.  Still has occasional backache.  He has no fever or chill.  Denies any chest pain or palpitation.  Has no shortness of breath or wheezing.  No nausea vomiting pain abdomen.  There is no change in his left-sided hemiparesis.  Review of other systems are negative.    Surgical History[1]    Family History[2]      Medications Ordered Prior to Encounter[3]    Blood pressure 120/70, pulse 52, height 1.778 m (5' 10\"), weight 94.3 kg (208 lb), SpO2 95%.  Body mass index is 29.84 kg/m².  General examination: There are no acute discomfort  Eyes: There are no pallor or jaundice pupils are equal and reactive to light  Ears: A small amount of cerumen is noted bilaterally.  Neck: There is no carotid bruit or JVD  Lungs: Clear to auscultation  CVS: Heart sounds are irregularly irregular.  There is no murmur  Abdomen: Soft there is no tenderness  CNS: Left-sided weakness is present  Legs: Some edema of the right lower extremity is present  Skin: Warm well-perfused    Assessment and plan:    1.  Nursing home and " hospital discharge follow-up  Hospital records are reviewed.  Medications are reconciled.  Compliance and tolerance are discussed with the patient.  Several prescriptions are renewed for the patient.    2.  Ambulatory dysfunction and generalized weakness  Improved with physical therapy, now at the baseline.    3. Left hemiparesis (Multi)  Left-sided weakness is unchanged    4. Atrial fibrillation, unspecified type (Multi)  Anticoagulated on apixaban    5. Seizure (Multi)  Keppra level is ordered as requested by his neurologist    6.  Bilateral cerumen  Advised to try over-the-counter Debrox eardrops.  Patient already has a follow-up appointment with me.                         [1]   Past Surgical History:  Procedure Laterality Date    MR HEAD ANGIO WO IV CONTRAST  6/29/2016    MR HEAD ANGIO WO IV CONTRAST 6/29/2016 [facility] CLINICAL LEGAC[facility]    MR NECK ANGIO WO IV CONTRAST  6/29/2016    MR NECK ANGIO WO IV CONTRAST 6/29/2016 [facility] CLINICAL LEGACY    OTHER SURGICAL HISTORY  12/14/2021    Oral surgery    OTHER SURGICAL HISTORY  12/14/2021    Root canal procedure    OTHER SURGICAL HISTORY  11/23/2016    Cranioplasty    OTHER SURGICAL HISTORY  08/18/2016    Craniotomy (Therapeutic)    VASECTOMY  06/25/2014    Surgery Vas Deferens Vasectomy   [2]   Family History  Problem Relation Name Age of Onset    Diabetes Mother      Cerebral aneurysm Father      Valvular heart disease Father      Coronary artery disease Brother      Heart attack Brother     [3]   Current Outpatient Medications on File Prior to Visit   Medication Sig Dispense Refill    amLODIPine (Norvasc) 5 mg tablet Take 1 tablet (5 mg) by mouth once daily. 90 tablet 1    apixaban (Eliquis) 5 mg tablet Take 1 tablet (5 mg) by mouth 2 times a day. 180 tablet 1    apixaban (Eliquis) 5 mg tablet Take 1 tablet (5 mg) by mouth 2 times a day. 180 tablet 3    atorvastatin (Lipitor) 40 mg tablet Take 1 tablet (40 mg) by mouth once daily. 90 tablet 1    baclofen (Lioresal) 10 mg  tablet Take 1.5 tablets (15 mg) by mouth 3 times a day.      cholecalciferol (Vitamin D3) 50 mcg (2,000 unit) capsule Take 5,000 Units by mouth early in the morning..      docusate sodium (Colace) 100 mg capsule Take 1 capsule (100 mg) by mouth 2 times a day as needed.      DULoxetine (Cymbalta) 30 mg DR capsule Take 1 capsule (30 mg) by mouth once daily. 90 capsule 1    levETIRAcetam (Keppra) 750 mg tablet Take 1 tablet (750 mg) by mouth 2 times a day.      lisinopril 40 mg tablet Take 1 tablet (40 mg) by mouth once daily. as directed 90 tablet 1    metoprolol tartrate (Lopressor) 50 mg tablet Take 1 tablet by mouth 2 times a day. 180 tablet 1    tamsulosin (Flomax) 0.4 mg 24 hr capsule Take 1 capsule (0.4 mg) by mouth once daily. 90 capsule 1    [DISCONTINUED] gabapentin (Neurontin) 100 mg capsule Take 1 capsule (100 mg) by mouth 3 times a day. (Patient not taking: Reported on 7/1/2025)       No current facility-administered medications on file prior to visit.

## 2025-07-02 ENCOUNTER — PATIENT OUTREACH (OUTPATIENT)
Dept: CARDIOLOGY | Facility: CLINIC | Age: 80
End: 2025-07-02
Payer: MEDICARE

## 2025-07-02 NOTE — PROGRESS NOTES
Confirmation of at least 2 patient identifiers.    Completed telephonic follow-up with patient after recent visit with Office Visit with Tim Castillo MD (07/01/2025)     Spoke to Spouse/significant other during outreach call.    Patient reports feeling: Improved    Patient has questions or concerns about medications: No    Have all prescribed medications been filled? Yes    Patient has necessary resources to manage their care? Yes    Patient has questions or concerns? No    Next care management follow-up approximately within one month.  Care  information provided to patient's wife.

## 2025-07-08 LAB — NON-UH HIE PROSTATIC SPECIFIC ANTIGEN: 2.7 NG/ML (ref 0–4)

## 2025-07-15 ENCOUNTER — APPOINTMENT (OUTPATIENT)
Dept: PRIMARY CARE | Facility: CLINIC | Age: 80
End: 2025-07-15
Payer: MEDICARE

## 2025-08-04 LAB
Lab: 18.81 UG/ML (ref 6–46)
NON-UH HIE ABSOLUTE BAND CT: 0 X1000 (ref 0–0.7)
NON-UH HIE ABSOLUTE BASO CT: 0.03 X1000 (ref 0–0.2)
NON-UH HIE ABSOLUTE EOS CT: 0.03 X1000 (ref 0–0.5)
NON-UH HIE ABSOLUTE LYMPH CT: 1.41 X1000 (ref 1.2–4.8)
NON-UH HIE ABSOLUTE MONO CT: 0.54 X1000 (ref 0.1–1)
NON-UH HIE ABSOLUTE NEUTROPHIL CT: 0.99 X1000 (ref 1.4–8.8)
NON-UH HIE ABSOLUTE SEG CT: 0.99 X1000 (ref 1.4–8.8)
NON-UH HIE ATYPICAL LYMPH: 9 % (ref 0–5)
NON-UH HIE BAND %: 0 %
NON-UH HIE BASOPHIL %: 1 %
NON-UH HIE CRENATED RBC: ABNORMAL
NON-UH HIE DIFF?: ABNORMAL
NON-UH HIE DXH ACTIONS: ABNORMAL
NON-UH HIE EOSINOPHIL %: 1 %
NON-UH HIE HCT: 40.9 % (ref 41–52)
NON-UH HIE HGB: 13.9 G/DL (ref 13.5–17.5)
NON-UH HIE INSTR WBC: 3
NON-UH HIE LARGE PLATELETS: ABNORMAL
NON-UH HIE LYMPHOCYTE %: 47 %
NON-UH HIE MCH: 31 PG (ref 27–34)
NON-UH HIE MCHC: 34.1 G/DL (ref 32–37)
NON-UH HIE MCV: 90.8 FL (ref 80–100)
NON-UH HIE MONOCYTE %: 18 %
NON-UH HIE MPV: 10.3 FL (ref 7.4–10.4)
NON-UH HIE NUCLEATED RBC: 0 /100WBC
NON-UH HIE OVALOCYTES: ABNORMAL
NON-UH HIE PLATELET: 114 X10 (ref 150–450)
NON-UH HIE POIKILOCYTOSIS: ABNORMAL
NON-UH HIE RBC: 4.51 X10 (ref 4.7–6.1)
NON-UH HIE RDW: 13.9 % (ref 11.5–14.5)
NON-UH HIE SEGMENTED NEUT %: 33 %
NON-UH HIE WBC: 3 X10 (ref 4.5–11)

## 2025-08-05 ENCOUNTER — RESULTS FOLLOW-UP (OUTPATIENT)
Dept: PRIMARY CARE | Facility: CLINIC | Age: 80
End: 2025-08-05
Payer: MEDICARE

## 2025-08-05 ENCOUNTER — PATIENT OUTREACH (OUTPATIENT)
Dept: CARDIOLOGY | Facility: CLINIC | Age: 80
End: 2025-08-05
Payer: MEDICARE

## 2025-08-05 NOTE — PROGRESS NOTES
Successful outreach to patient regarding hospitalization as patient continues TCM program. Spoke with wife.   At time of outreach call the patient feels as if their condition has improved since initial visit with PCP or specialist. Therapy coming once a week and really helping Jose Angel.   No Questions or concerns that need addressed at this time .   Provided contact information to patient if any further non-emergent needs arise.

## 2025-08-05 NOTE — TELEPHONE ENCOUNTER
Patient's wife is aware of results. ----- Message from Tim Castillo sent at 8/4/2025  2:03 PM EDT -----  Platelet count is low as before.  Other results are acceptable  ----- Message -----  From: Wally Delgadillo - Lab Results In  Sent: 8/4/2025   1:53 PM EDT  To: Tim Castillo MD

## 2025-08-07 PROCEDURE — RXMED WILLOW AMBULATORY MEDICATION CHARGE

## 2025-08-08 ENCOUNTER — PHARMACY VISIT (OUTPATIENT)
Dept: PHARMACY | Facility: CLINIC | Age: 80
End: 2025-08-08
Payer: COMMERCIAL

## 2025-10-13 ENCOUNTER — APPOINTMENT (OUTPATIENT)
Dept: PRIMARY CARE | Facility: CLINIC | Age: 80
End: 2025-10-13
Payer: MEDICARE

## 2025-10-23 ENCOUNTER — APPOINTMENT (OUTPATIENT)
Dept: CARDIOLOGY | Facility: CLINIC | Age: 80
End: 2025-10-23
Payer: MEDICARE

## 2025-11-17 ENCOUNTER — APPOINTMENT (OUTPATIENT)
Dept: DERMATOLOGY | Facility: CLINIC | Age: 80
End: 2025-11-17
Payer: MEDICARE

## 2026-04-14 ENCOUNTER — APPOINTMENT (OUTPATIENT)
Dept: PHARMACY | Facility: HOSPITAL | Age: 81
End: 2026-04-14
Payer: MEDICARE